# Patient Record
Sex: FEMALE | Race: WHITE | NOT HISPANIC OR LATINO | Employment: FULL TIME | URBAN - METROPOLITAN AREA
[De-identification: names, ages, dates, MRNs, and addresses within clinical notes are randomized per-mention and may not be internally consistent; named-entity substitution may affect disease eponyms.]

---

## 2024-07-11 ENCOUNTER — CLINICAL SUPPORT (OUTPATIENT)
Dept: AUDIOLOGY | Facility: CLINIC | Age: 29
End: 2024-07-11
Payer: MEDICAID

## 2024-07-11 ENCOUNTER — APPOINTMENT (OUTPATIENT)
Dept: OTOLARYNGOLOGY | Facility: CLINIC | Age: 29
End: 2024-07-11
Payer: MEDICAID

## 2024-07-11 VITALS — BODY MASS INDEX: 43.41 KG/M2 | WEIGHT: 245 LBS | HEIGHT: 63 IN

## 2024-07-11 DIAGNOSIS — H90.41 SENSORINEURAL HEARING LOSS (SNHL) OF RIGHT EAR WITH UNRESTRICTED HEARING OF LEFT EAR: Primary | ICD-10-CM

## 2024-07-11 DIAGNOSIS — H93.11 TINNITUS OF RIGHT EAR: ICD-10-CM

## 2024-07-11 PROCEDURE — 99203 OFFICE O/P NEW LOW 30 MIN: CPT | Performed by: PHYSICIAN ASSISTANT

## 2024-07-11 PROCEDURE — 92557 COMPREHENSIVE HEARING TEST: CPT | Performed by: AUDIOLOGIST

## 2024-07-11 PROCEDURE — 92567 TYMPANOMETRY: CPT | Performed by: AUDIOLOGIST

## 2024-07-11 RX ORDER — LAMOTRIGINE 25 MG/1
50 TABLET ORAL
COMMUNITY
Start: 2022-11-21

## 2024-07-11 RX ORDER — ESCITALOPRAM OXALATE 10 MG/1
10 TABLET ORAL
COMMUNITY
Start: 2023-11-09

## 2024-07-11 RX ORDER — ALBUTEROL SULFATE 90 UG/1
2-4 AEROSOL, METERED RESPIRATORY (INHALATION) EVERY 2 HOUR PRN
COMMUNITY
Start: 2022-11-21

## 2024-07-11 RX ORDER — BENZONATATE 100 MG/1
100 CAPSULE ORAL EVERY 8 HOURS PRN
COMMUNITY
Start: 2022-11-21

## 2024-07-11 NOTE — PROGRESS NOTES
Sandy Cao is a 28 y.o. year old female patient with Tinnitus     The patient presents to the office today for concerns of diminished hearing and tinnitus in the right ear.  Patient reports that this occurred suddenly. She denies prior history of previous hearing loss. She denies other ENT related concerns at this time.       Review of Systems   All other systems reviewed and are negative.        Physical Exam:   General appearance: No acute distress. Normal facies. Symmetric facial movement. No gross lesions of the face are noted.  The external ear structures appear normal. The ear canals patent and the tympanic membranes are intact without evidence of air-fluid levels, retraction, or congenital defects.  Anterior rhinoscopy notes essentially a midline nasal septum. Examination is noted for normal healthy mucosal membranes without any evidence of lesions, polyps, or exudate. The tongue is normally mobile. There are no lesions on the gingiva, buccal, or oral mucosa. There are no oral cavity masses.  The neck is negative for mass lymphadenopathy. The trachea and parotid are clear. The thyroid bed is grossly unremarkable. The salivary gland structures are grossly unremarkable.    The patient's audiogram demonstrates essentially normal hearing left with a mild to severe sloping sensorineural hearing loss right.    Assessment/Plan   1.  Sensorineural hearing loss right ear    Patient seen in the office today for assessment of ears and hearing with sensorineural hearing loss right with tinnitus complaints.  The patient had reported sudden hearing loss 1 year ago.  Patient has mild to severe sensorineural hearing loss right ear at this time is going to be set up for MRI IAC temporal bone.  Will see the patient back after MRI.

## 2024-07-11 NOTE — PROGRESS NOTES
Sandy, age 28, was seen today for a hearing evaluation during her ENT visit with Dr. Son's physician's assistant, Tez Dorantes.  She reported concern for right hearing loss/muffled sensation and constant tinnitus.  She reported this seemed to be a sudden change while showering one year ago.      Results:  Otoscopy revealed clear ear canals and tympanic membranes were visualized bilaterally.  Tympanometry revealed normal, Type A tympanograms, indicating normal ear canal volume, peak pressure and compliance bilaterally.  Audiometric thresholds revealed normal hearing sensitivity in her left ear and a mild sloping to severe sensorineural hearing loss in her right ear.  Word recognition scores were excellent bilaterally.    Recommendations:  Follow-up with ENT, as medically directed.  Retest hearing in 6 months for monitoring purposes in conjunction with otologic management.  Consider amplification for right ear pending medical clearance.

## 2024-08-06 ENCOUNTER — HOSPITAL ENCOUNTER (OUTPATIENT)
Dept: RADIOLOGY | Facility: CLINIC | Age: 29
Discharge: HOME | End: 2024-08-06
Payer: MEDICAID

## 2024-08-06 DIAGNOSIS — H90.41 SENSORINEURAL HEARING LOSS (SNHL) OF RIGHT EAR WITH UNRESTRICTED HEARING OF LEFT EAR: ICD-10-CM

## 2024-08-06 PROCEDURE — 2550000001 HC RX 255 CONTRASTS: Performed by: PHYSICIAN ASSISTANT

## 2024-08-06 PROCEDURE — 70553 MRI BRAIN STEM W/O & W/DYE: CPT | Performed by: RADIOLOGY

## 2024-08-06 PROCEDURE — A9575 INJ GADOTERATE MEGLUMI 0.1ML: HCPCS | Performed by: PHYSICIAN ASSISTANT

## 2024-08-06 PROCEDURE — 70553 MRI BRAIN STEM W/O & W/DYE: CPT

## 2024-08-06 RX ORDER — GADOTERATE MEGLUMINE 376.9 MG/ML
22 INJECTION INTRAVENOUS
Status: COMPLETED | OUTPATIENT
Start: 2024-08-06 | End: 2024-08-06

## 2024-08-26 ENCOUNTER — APPOINTMENT (OUTPATIENT)
Dept: OTOLARYNGOLOGY | Facility: CLINIC | Age: 29
End: 2024-08-26
Payer: MEDICAID

## 2024-08-26 DIAGNOSIS — D33.3 ACOUSTIC NEUROMA (MULTI): Primary | ICD-10-CM

## 2024-08-26 PROCEDURE — 99213 OFFICE O/P EST LOW 20 MIN: CPT | Performed by: OTOLARYNGOLOGY

## 2024-08-26 NOTE — PROGRESS NOTES
The patient returns.  We are seeing her back today with her completed MRI done for unilateral hearing loss right side with tinnitus.  She shows evidence of a 3.2 cm acoustic neuroma.  She is here to review same.  Remaining ENT inquiry is clear.    Exam:  No acute distress.  The external ear structures appear normal. The ear canals patent and the tympanic membranes are intact without evidence of air-fluid levels, retraction, or congenital defects.  Anterior rhinoscopy notes essentially a midline nasal septum. Examination is noted for normal healthy mucosal membranes without any evidence of lesions, polyps, or exudate. The tongue is normally mobile. There are no lesions on the gingiva, buccal, or oral mucosa. There are no oral cavity masses.  The neck is negative for mass lymphadenopathy. The trachea and parotid are clear. The thyroid bed is grossly unremarkable. The salivary gland structures are grossly unremarkable.    MRI has been reviewed by me personally as noted above.    Assessment and plan:  Very prominent acoustic neuroma right side.  Patient was recommended to see our dedicated colleague Dr. Conrad and neurotology for definitive intervention in particular at her young age of 28.  Very detailed discussion with her in this regard with all questions answered.  See me back personally should other issues arise.

## 2024-09-10 ENCOUNTER — APPOINTMENT (OUTPATIENT)
Dept: OTOLARYNGOLOGY | Facility: CLINIC | Age: 29
End: 2024-09-10
Payer: MEDICAID

## 2024-09-10 DIAGNOSIS — D33.3 ACOUSTIC NEUROMA (MULTI): Primary | ICD-10-CM

## 2024-09-10 DIAGNOSIS — H90.41 SENSORINEURAL HEARING LOSS (SNHL) OF RIGHT EAR WITH UNRESTRICTED HEARING OF LEFT EAR: ICD-10-CM

## 2024-09-10 PROCEDURE — 99204 OFFICE O/P NEW MOD 45 MIN: CPT | Performed by: OTOLARYNGOLOGY

## 2024-09-10 NOTE — PROGRESS NOTES
Sandy Cao is a 28 y.o. female is referred by Dr Prashanth Son for evaluation and management of a right IAC/CPA lesion. The isit was done virtually using a AV platform. The patient is accompanied by no one.   When asked about ear pain, hearing loss, discharge from ear, tinnitus, imbalance or vertigo, the patient admits to intermittent headache, and progressive hearing loss with tinnitus. Hearing has gotten worse since last audiogram.  When asked about a significant past otological history including history of prior ear surgery, noise exposure, exposure to ototoxic drugs or agents, and/or family history of hearing loss, the patient admits to no significant past history.  The patient's current medications, active allergies and list of medical problems were reviewed in the EHR and confirmed electronically there are as follows;  Allergies:   Allergies   Allergen Reactions    Cat Dander Unknown    House Dust Mite Unknown     Current list of medications:   Current Outpatient Medications   Medication Sig Dispense Refill    albuterol 90 mcg/actuation inhaler Inhale 2-4 puffs every 2 hours if needed.      benzonatate (Tessalon) 100 mg capsule Take 1 capsule (100 mg) by mouth every 8 hours if needed.      escitalopram (Lexapro) 10 mg tablet Take 1 tablet (10 mg) by mouth once daily.      lamoTRIgine (LaMICtal) 25 mg tablet Take 2 tablets (50 mg) by mouth once daily.       No current facility-administered medications for this visit.     Problem list:  Patient Active Problem List   Diagnosis    Sensorineural hearing loss (SNHL) of right ear with unrestricted hearing of left ear    Tinnitus of right ear    Acoustic neuroma (Multi)     Medical history:  History reviewed. No pertinent past medical history.  Surgical history:  History reviewed. No pertinent surgical history.  Family history:  No family history on file.  Social history:       Physical Examination:  CONSTITUTIONAL:  No acute distress  VOICE:  No hoarseness or other  abnormality  RESPIRATION:  Breathing comfortably, no stridor  EYES:  EOM intact, sclera clear  NEURO:  Alert and oriented times 3, Cranial nerves II-XII grossly intact and symmetric bilaterally  HEAD AND FACE:  Symmetric facial features, no masses or lesions    SKIN:  Neck and facial skin is without scar or injury  PSYCH:  Alert and oriented with appropriate mood and affect    Diagnostic testing:  An MRI available for review showed a right  IAC/CPA lesion with radiographic features consistent with a right-sided vestibular schwannoma. The tumor dimension are 3.. x 2.3 x 2.2. A mass effect on the brainstem is present without hydrocephalus.  The audiogram showed mild to moderate SNHL on the right with good SD  I personally reviewed the available patient's external record and independently reviewed their audiometric testing [and] radiographic imaging through the appropriate viewing software as detailed in my note and agree with the detailed report.    Impression:  right  CPA lesion  right  Sensorineural hearing loss    Recommendation:  The patient's condition was reviewed and explained. The treatment options and alternatives were discussed in length and those included observation, radiotherapy and microsurgery. Given the patient's age and hearing, and the tumor characteristics, I recommended surgical management with a retrosigmoid approach. The surgical risks discussed included but not limited to rarely death, bleeding, infection, CSF leak, facial nerve injury, residual disease, abdominal hematoma and other rare complications. A neurosurgical consultation and medical clearance will be completed prior to scheduling the surgery. All questions were answered to the patient's satisfaction. Also will need a repeat audiogram.     I discussed with the patient the complexity of my medical decision making including the treatment and testing rational, indications of their elective procedure and possible adverse effects and/or  complications. Based on the provided documentation and my professional assessment of this patient's newly diagnosed conditions, the complexity of evaluation and treatment is moderate.    This note was created using speech recognition transcription software. Despite proofreading, several typographical errors might be present that might affect the meaning of the content. Please call with any questions.    Patient Education:  Vestibular Schwannoma   Vestibular schwannoma, also called acoustic neuroma, is a benign tumor involving the hearing and balance nerve at the base of the brain. It occurs in is about 1 per 100,000 people per year. Acoustic neuromas do not spread throughout the body, but can cause significant disability, including hearing loss, dizziness, facial numbness, and, rarely in this era, even death, by local growth into nearby important brain structures.     Early symptoms of an acoustic neuroma include hearing loss, distorted sound perception, tinnitus, dizziness, and disequilibrium. Later symptoms may include headache, unsteadiness, facial pain, tingling, or numbness, facial tics or weakness, double vision, and difficulty in swallowing or talking.   There are a number of tests that can be utilized to diagnose acoustic neuromas, the utility of which should be based upon a complete history and physical by an experienced physician. The definitive diagnostic test is an MRI with gadolinium enhancement. However, this test should only be obtained following appropriate clinical evaluation, and hearing, and balance testing where indicated.     Management options include observation with serial MRIs, partial or total surgical removal, and radiation therapy. The treatment is tailored to the individual, and depends upon the patient's symptoms, hearing level, health status, age, and the growth rate of the tumor, and the wishes of the patient. Surgery for acoustic neuromas requiring intervention will involve one of  three basic approaches: (1) through the temple or middle fossa approach, (2) through the ear or translabyrinthine, and (3) through the back of the head or retrosigmoid approach. The approach used depends upon the size and location of the tumor, the status of the preoperative hearing, and the experience and preference of the surgical team. The optimal treatment goal is removal of the tumor while maintaining existing hearing and facial function. In many cases, hearing in the affected ear cannot be preserved. Since acoustic neuromas are usually slow growing, partial tumor removal may be elected by the surgeon to reduce surgical time and preserve facial function. For patients with a growing tumor who are not surgical candidates for whatever reason, targeted radiation is a good alternative.     Possible complications that may require further medical and/or surgical rehabilitation include: hearing loss, dizziness, facial weakness or paralysis, prolonged headaches, fluid leak from around the brain, and tumor recurrence. Many of these complications can occur after either microsurgery or radiation therapy.

## 2024-10-02 DIAGNOSIS — D33.3 ACOUSTIC NEUROMA (MULTI): ICD-10-CM

## 2024-10-02 DIAGNOSIS — H90.41 SENSORINEURAL HEARING LOSS (SNHL) OF RIGHT EAR WITH UNRESTRICTED HEARING OF LEFT EAR: ICD-10-CM

## 2024-10-07 ENCOUNTER — LAB (OUTPATIENT)
Dept: LAB | Facility: LAB | Age: 29
End: 2024-10-07
Payer: MEDICAID

## 2024-10-07 ENCOUNTER — OFFICE VISIT (OUTPATIENT)
Dept: NEUROSURGERY | Facility: HOSPITAL | Age: 29
End: 2024-10-07
Payer: MEDICAID

## 2024-10-07 VITALS
WEIGHT: 246.91 LBS | HEIGHT: 63 IN | SYSTOLIC BLOOD PRESSURE: 116 MMHG | RESPIRATION RATE: 17 BRPM | HEART RATE: 79 BPM | DIASTOLIC BLOOD PRESSURE: 79 MMHG | TEMPERATURE: 98.1 F | BODY MASS INDEX: 43.75 KG/M2

## 2024-10-07 DIAGNOSIS — Z01.818 PREOPERATIVE EXAMINATION: ICD-10-CM

## 2024-10-07 DIAGNOSIS — D33.3 ACOUSTIC NEUROMA (MULTI): Primary | ICD-10-CM

## 2024-10-07 DIAGNOSIS — D33.3 ACOUSTIC NEUROMA (MULTI): ICD-10-CM

## 2024-10-07 LAB
ABO GROUP (TYPE) IN BLOOD: NORMAL
ALBUMIN SERPL BCP-MCNC: 4.3 G/DL (ref 3.4–5)
ALP SERPL-CCNC: 45 U/L (ref 33–110)
ALT SERPL W P-5'-P-CCNC: 17 U/L (ref 7–45)
ANION GAP SERPL CALC-SCNC: 10 MMOL/L (ref 10–20)
ANTIBODY SCREEN: NORMAL
APPEARANCE UR: CLEAR
APTT PPP: 34 SECONDS (ref 27–38)
AST SERPL W P-5'-P-CCNC: 15 U/L (ref 9–39)
BASOPHILS # BLD AUTO: 0.03 X10*3/UL (ref 0–0.1)
BASOPHILS NFR BLD AUTO: 0.4 %
BILIRUB SERPL-MCNC: 0.5 MG/DL (ref 0–1.2)
BILIRUB UR STRIP.AUTO-MCNC: NEGATIVE MG/DL
BUN SERPL-MCNC: 10 MG/DL (ref 6–23)
CALCIUM SERPL-MCNC: 9.9 MG/DL (ref 8.6–10.6)
CHLORIDE SERPL-SCNC: 104 MMOL/L (ref 98–107)
CO2 SERPL-SCNC: 29 MMOL/L (ref 21–32)
COLOR UR: NORMAL
CREAT SERPL-MCNC: 0.92 MG/DL (ref 0.5–1.05)
EGFRCR SERPLBLD CKD-EPI 2021: 88 ML/MIN/1.73M*2
EOSINOPHIL # BLD AUTO: 0.28 X10*3/UL (ref 0–0.7)
EOSINOPHIL NFR BLD AUTO: 4 %
ERYTHROCYTE [DISTWIDTH] IN BLOOD BY AUTOMATED COUNT: 12.3 % (ref 11.5–14.5)
GLUCOSE SERPL-MCNC: 104 MG/DL (ref 74–99)
GLUCOSE UR STRIP.AUTO-MCNC: NORMAL MG/DL
HCT VFR BLD AUTO: 39.8 % (ref 36–46)
HGB BLD-MCNC: 13.5 G/DL (ref 12–16)
IMM GRANULOCYTES # BLD AUTO: 0.03 X10*3/UL (ref 0–0.7)
IMM GRANULOCYTES NFR BLD AUTO: 0.4 % (ref 0–0.9)
INR PPP: 1 (ref 0.9–1.1)
KETONES UR STRIP.AUTO-MCNC: NEGATIVE MG/DL
LEUKOCYTE ESTERASE UR QL STRIP.AUTO: NEGATIVE
LYMPHOCYTES # BLD AUTO: 2.49 X10*3/UL (ref 1.2–4.8)
LYMPHOCYTES NFR BLD AUTO: 35.4 %
MCH RBC QN AUTO: 29.2 PG (ref 26–34)
MCHC RBC AUTO-ENTMCNC: 33.9 G/DL (ref 32–36)
MCV RBC AUTO: 86 FL (ref 80–100)
MONOCYTES # BLD AUTO: 0.52 X10*3/UL (ref 0.1–1)
MONOCYTES NFR BLD AUTO: 7.4 %
NEUTROPHILS # BLD AUTO: 3.69 X10*3/UL (ref 1.2–7.7)
NEUTROPHILS NFR BLD AUTO: 52.4 %
NITRITE UR QL STRIP.AUTO: NEGATIVE
NRBC BLD-RTO: 0 /100 WBCS (ref 0–0)
PH UR STRIP.AUTO: 7 [PH]
PLATELET # BLD AUTO: 228 X10*3/UL (ref 150–450)
POTASSIUM SERPL-SCNC: 4.1 MMOL/L (ref 3.5–5.3)
PROT SERPL-MCNC: 6.8 G/DL (ref 6.4–8.2)
PROT UR STRIP.AUTO-MCNC: NEGATIVE MG/DL
PROTHROMBIN TIME: 11.4 SECONDS (ref 9.8–12.8)
RBC # BLD AUTO: 4.62 X10*6/UL (ref 4–5.2)
RBC # UR STRIP.AUTO: NEGATIVE /UL
RH FACTOR (ANTIGEN D): NORMAL
SODIUM SERPL-SCNC: 139 MMOL/L (ref 136–145)
SP GR UR STRIP.AUTO: 1.01
UROBILINOGEN UR STRIP.AUTO-MCNC: NORMAL MG/DL
WBC # BLD AUTO: 7 X10*3/UL (ref 4.4–11.3)

## 2024-10-07 PROCEDURE — 99214 OFFICE O/P EST MOD 30 MIN: CPT | Mod: 57 | Performed by: NEUROLOGICAL SURGERY

## 2024-10-07 PROCEDURE — 86900 BLOOD TYPING SEROLOGIC ABO: CPT

## 2024-10-07 PROCEDURE — 85610 PROTHROMBIN TIME: CPT

## 2024-10-07 PROCEDURE — 85025 COMPLETE CBC W/AUTO DIFF WBC: CPT

## 2024-10-07 PROCEDURE — 86850 RBC ANTIBODY SCREEN: CPT

## 2024-10-07 PROCEDURE — 3008F BODY MASS INDEX DOCD: CPT | Performed by: NEUROLOGICAL SURGERY

## 2024-10-07 PROCEDURE — 81003 URINALYSIS AUTO W/O SCOPE: CPT

## 2024-10-07 PROCEDURE — 80053 COMPREHEN METABOLIC PANEL: CPT

## 2024-10-07 PROCEDURE — 99204 OFFICE O/P NEW MOD 45 MIN: CPT | Performed by: NEUROLOGICAL SURGERY

## 2024-10-07 PROCEDURE — 85730 THROMBOPLASTIN TIME PARTIAL: CPT

## 2024-10-07 PROCEDURE — 86901 BLOOD TYPING SEROLOGIC RH(D): CPT

## 2024-10-07 PROCEDURE — 36415 COLL VENOUS BLD VENIPUNCTURE: CPT

## 2024-10-07 RX ORDER — CHLORHEXIDINE GLUCONATE 40 MG/ML
SOLUTION TOPICAL DAILY
Qty: 473 ML | Refills: 0 | Status: SHIPPED | OUTPATIENT
Start: 2024-10-07 | End: 2024-10-12

## 2024-10-07 RX ORDER — CHLORHEXIDINE GLUCONATE ORAL RINSE 1.2 MG/ML
SOLUTION DENTAL
Qty: 118 ML | Refills: 0 | Status: SHIPPED | OUTPATIENT
Start: 2024-10-07

## 2024-10-07 ASSESSMENT — ENCOUNTER SYMPTOMS
LOSS OF SENSATION IN FEET: 0
DEPRESSION: 1
OCCASIONAL FEELINGS OF UNSTEADINESS: 0

## 2024-10-07 ASSESSMENT — PAIN SCALES - GENERAL: PAINLEVEL: 0-NO PAIN

## 2024-10-07 NOTE — H&P (VIEW-ONLY)
"[facility]  Neurosurgery    History of Present Illness      Sandy Cao is a 28-year-old female with a PMH significant for anxiety, depression, who presented for evaluation of right sided hearing loss with tinnitus with Dr. Son with ENT. Audiogram was completed 07/11/24 with normal hearing on the left and severe SNHL on the right with word recognition excellent bilaterally. Given findings patient was recommended for MRI IAC where she was found to have a 3.2cm acoustic neuroma. Patient was evaluated by Dr. Conrad and recommended for surgical resection via translab approach. She was referred to neurosurgery and presents to clinic for surgical discussion.        She reports right sided decreased hearing that started about 2 years ago and reports some mild right facial numbness.           Objective      Vitals:   /79 (BP Location: Left arm, Patient Position: Sitting, BP Cuff Size: Large adult)   Pulse 79   Temp 36.7 °C (98.1 °F)   Resp 17   Ht 1.6 m (5' 3\")   Wt 112 kg (246 lb 14.6 oz)   LMP  (LMP Unknown)   BMI 43.74 kg/m²         Physical Exam:    Awake, alert, oriented times 3.   EOM intact  Pupils are equal round and reactive  R V1: 70%, V2:80%, V3: 80% to light touch  L V1-3: 100% to light touch  No facial asymmetry  Decreased R sided hearing  Midline tongue protrusion  Symmetric shoulder elevation  Symmetric head turning    5/5 in all extremities  No sensory deficits          Relevant Results:    I reviewed MRI  MRI IAC 08/06/24:           Assessment & Plan      Diagnosis:  Diagnoses and all orders for this visit:  Acoustic neuroma (Multi)          Provider Impression:   Mrs Cao presents to discuss treatment options for her tumor that is concerning for right sided vestibular schwannoma. I explained that given the large size and her age, I recommended treatment. Radiosurgery is not an option given the tumor size, so I recommended surgical resection. She understands the risks and " agreed to proceed with surgery. We will place a lumbar drain intraoperatively.     Patient seen and examined and has symptoamtic large acoustic tumor    Natural history and treatment options discussed in detail.    Given patient age, symptoms, etc. I recommend consideration for resection with Dr Conrad transmastoid resection translabyrinthine    Risks of treatment and alternatives discussed in detail (bleeding, infection, paralysis, stroke, death, observation, facial nerver paralysis, radiosurgeryu, CSF leak, deafness will be permanent on right, etc.) and patient agrees to proceed as noted above.    I also discussed that I perform overlapping surgical cases but would be present for all critical portions of the surgical procedure.    Medical History     No past medical history on file.  No past surgical history on file.     No family history on file.  Allergies   Allergen Reactions    Cat Dander Unknown    House Dust Mite Unknown     Current Outpatient Medications   Medication Instructions    albuterol 90 mcg/actuation inhaler 2-4 puffs, inhalation, Every 2 hour PRN    benzonatate (TESSALON) 100 mg, oral, Every 8 hours PRN    escitalopram (LEXAPRO) 10 mg, oral, Daily RT    lamoTRIgine (LAMICTAL) 50 mg, oral, Daily RT

## 2024-10-07 NOTE — PROGRESS NOTES
"[facility]  Neurosurgery    History of Present Illness      Sandy Cao is a 28-year-old female with a PMH significant for anxiety, depression, who presented for evaluation of right sided hearing loss with tinnitus with Dr. Son with ENT. Audiogram was completed 07/11/24 with normal hearing on the left and severe SNHL on the right with word recognition excellent bilaterally. Given findings patient was recommended for MRI IAC where she was found to have a 3.2cm acoustic neuroma. Patient was evaluated by Dr. Conrad and recommended for surgical resection via translab approach. She was referred to neurosurgery and presents to clinic for surgical discussion.        She reports right sided decreased hearing that started about 2 years ago and reports some mild right facial numbness.           Objective      Vitals:   /79 (BP Location: Left arm, Patient Position: Sitting, BP Cuff Size: Large adult)   Pulse 79   Temp 36.7 °C (98.1 °F)   Resp 17   Ht 1.6 m (5' 3\")   Wt 112 kg (246 lb 14.6 oz)   LMP  (LMP Unknown)   BMI 43.74 kg/m²         Physical Exam:    Awake, alert, oriented times 3.   EOM intact  Pupils are equal round and reactive  R V1: 70%, V2:80%, V3: 80% to light touch  L V1-3: 100% to light touch  No facial asymmetry  Decreased R sided hearing  Midline tongue protrusion  Symmetric shoulder elevation  Symmetric head turning    5/5 in all extremities  No sensory deficits          Relevant Results:    I reviewed MRI  MRI IAC 08/06/24:           Assessment & Plan      Diagnosis:  Diagnoses and all orders for this visit:  Acoustic neuroma (Multi)          Provider Impression:   Mrs Cao presents to discuss treatment options for her tumor that is concerning for right sided vestibular schwannoma. I explained that given the large size and her age, I recommended treatment. Radiosurgery is not an option given the tumor size, so I recommended surgical resection. She understands the risks and " agreed to proceed with surgery. We will place a lumbar drain intraoperatively.     Patient seen and examined and has symptoamtic large acoustic tumor    Natural history and treatment options discussed in detail.    Given patient age, symptoms, etc. I recommend consideration for resection with Dr Conrad transmastoid resection translabyrinthine    Risks of treatment and alternatives discussed in detail (bleeding, infection, paralysis, stroke, death, observation, facial nerver paralysis, radiosurgeryu, CSF leak, deafness will be permanent on right, etc.) and patient agrees to proceed as noted above.    I also discussed that I perform overlapping surgical cases but would be present for all critical portions of the surgical procedure.    Medical History     No past medical history on file.  No past surgical history on file.     No family history on file.  Allergies   Allergen Reactions    Cat Dander Unknown    House Dust Mite Unknown     Current Outpatient Medications   Medication Instructions    albuterol 90 mcg/actuation inhaler 2-4 puffs, inhalation, Every 2 hour PRN    benzonatate (TESSALON) 100 mg, oral, Every 8 hours PRN    escitalopram (LEXAPRO) 10 mg, oral, Daily RT    lamoTRIgine (LAMICTAL) 50 mg, oral, Daily RT

## 2024-10-08 ENCOUNTER — CLINICAL SUPPORT (OUTPATIENT)
Dept: PREADMISSION TESTING | Facility: HOSPITAL | Age: 29
End: 2024-10-08
Payer: MEDICAID

## 2024-10-08 NOTE — CPM/PAT NURSE NOTE
CPM/PAT Nurse Note      Name: Sandy Cao (Sandy Cao)  /Age: 1995/ y.o.       Past Medical History:   Diagnosis Date    Acoustic neuroma (Multi)     Anxiety     Asthma     Depression     HL (hearing loss)     Mood disorder (CMS-HCC)     Tinnitus        Past Surgical History:   Procedure Laterality Date    ESOPHAGOGASTRODUODENOSCOPY         Patient  has no history on file for sexual activity.    No family history on file.    Allergies   Allergen Reactions    Cat Dander Unknown    House Dust Mite Unknown       Prior to Admission medications    Medication Sig Start Date End Date Taking? Authorizing Provider   albuterol 90 mcg/actuation inhaler Inhale 2-4 puffs every 2 hours if needed. 22   Historical Provider, MD   benzonatate (Tessalon) 100 mg capsule Take 1 capsule (100 mg) by mouth every 8 hours if needed. 22   Historical Provider, MD   chlorhexidine (Hibiclens) 4 % external liquid Apply topically once daily for 5 days. Use nightly starting 5 nights prior to surgery. Shower like normal then apply soap and avoid face and groin. Leave on for 3 -5 minutes after rinse off. 10/7/24 10/12/24  Lesley Paulson, APRN-CNP   chlorhexidine (Peridex) 0.12 % solution RINSE MOUTH WITH 15ML (1 CAPFUL) FOR 30 SECONDS AM AND PM PRIOR TO SURGERY. AFTER TOOTHBRUSHING. EXPECTORATE AFTER. 10/7/24   MARCO Salcedo-CNP   escitalopram (Lexapro) 10 mg tablet Take 1 tablet (10 mg) by mouth once daily. 23   Historical Provider, MD   lamoTRIgine (LaMICtal) 25 mg tablet Take 2 tablets (50 mg) by mouth once daily. 22   Historical Provider, MD HOUSE ROS     DASI Risk Score    No data to display       Caprini DVT Assessment    No data to display       Modified Frailty Index    No data to display       CHADS2 Stroke Risk  Current as of about an hour ago        N/A 3 to 100%: High Risk   2 to < 3%: Medium Risk   0 to < 2%: Low Risk     Last Change: N/A          This score determines the  patient's risk of having a stroke if the patient has atrial fibrillation.        This score is not applicable to this patient. Components are not calculated.          Revised Cardiac Risk Index    No data to display       Apfel Simplified Score    No data to display       Risk Analysis Index Results This Encounter    No data found in the last 10 encounters.     Scheduled for RIGHT SIDE TRANSLABYRINTHINE APPROACH FOR ACOUSTIC NEUROMA RESECTION AND REPAIR - Right with Dr. Morgan and Dr. Conrad on 10/14/24.    Neurosurgery: MARCO Salcedo-CNP LOV 10/7/24 presented for evaluation of right sided hearing loss with tinnitus with Dr. Son with ENT. She was referred to neurosurgery and presents to clinic for surgical discussion.   Otolaryngology: Estella Conrad MD LOV 9/10/24 referred by Dr Prashanth Son for evaluation and management of a right IAC/CPA lesion.   Otolaryngology: Prashanth Son MD LOV 8/26/24 seeing her back today with her completed MRI done for unilateral hearing loss right side with tinnitus.   Otolaryngology: Tez Dorantes PA-C LOV 7/11/24 seen for diminished hearing and tinnitus in the right ear.     - MR IAC W AND WO IV CONTRAST; 8/6/2024   IMPRESSION:  3.2 cm enhancing mass within the right internal auditory canal and  right cerebellopontine angle region likely corresponding to acoustic  neuroma/vestibular schwannoma. There is no definite evidence of  extension to the basal turn of the cochlea. There is mass effect on  the veronica, cerebellum, and right brachium pontis without vasogenic  edema. There is also mild mass effect along the rightward aspect of  the 4th ventricle with no evidence of hydrocephalus.      No acute infarct, recent hemorrhage, or additional abnormal  enhancement or intracranial mass effect.      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    Nurse Plan of Action:    ONLY    Jess Villegas RN  Pre-Admission Testing

## 2024-10-10 ENCOUNTER — APPOINTMENT (OUTPATIENT)
Dept: PREADMISSION TESTING | Facility: HOSPITAL | Age: 29
End: 2024-10-10
Payer: MEDICAID

## 2024-10-11 NOTE — PROGRESS NOTES
Pharmacy Medication History Review    Sandy Cao is a 28 y.o. female who is planned to be admitted for No Principal Problem: There is no principal problem currently on the Problem List. Please update the Problem List and refresh.. Pharmacy called the patient prior to their scheduled procedure and reviewed the patient's [id number] medications for accuracy.    Medications ADDED:  none  Medications CHANGED:  none  Medications REMOVED:   Albuterol HFA  Benzonatate 100mg  Escitalopram 10mg  Lamotrigine 25mg    Please review updated prior to admission medication list and comments regarding how patient may be taking medications differently by going to Admission tab --> Admission Orders --> Admit Orders / Review prior to admission medications.     Preferred pharmacy and allergies to be confirmed with patient by nursing the day of procedure.     Sources used to complete the med history include spoke with patient, surescripts, jean marierrs, care everywhere    Below are additional concerns with the patient's PTA list.  Patient states they do NOT take any medications at this time    Lavonne Cabello    Meds Ambulatory and Retail Services  Please reach out via Secure Chat for questions

## 2024-10-13 ENCOUNTER — ANESTHESIA EVENT (OUTPATIENT)
Dept: OPERATING ROOM | Facility: HOSPITAL | Age: 29
End: 2024-10-13
Payer: MEDICAID

## 2024-10-14 ENCOUNTER — HOSPITAL ENCOUNTER (INPATIENT)
Facility: HOSPITAL | Age: 29
LOS: 4 days | Discharge: HOME | End: 2024-10-18
Attending: NEUROLOGICAL SURGERY | Admitting: NEUROLOGICAL SURGERY
Payer: MEDICAID

## 2024-10-14 ENCOUNTER — APPOINTMENT (OUTPATIENT)
Dept: RADIOLOGY | Facility: HOSPITAL | Age: 29
End: 2024-10-14
Payer: MEDICAID

## 2024-10-14 ENCOUNTER — ANESTHESIA (OUTPATIENT)
Dept: OPERATING ROOM | Facility: HOSPITAL | Age: 29
End: 2024-10-14
Payer: MEDICAID

## 2024-10-14 ENCOUNTER — HOSPITAL ENCOUNTER (INPATIENT)
Dept: NEUROLOGY | Facility: HOSPITAL | Age: 29
Discharge: HOME | End: 2024-10-14
Payer: MEDICAID

## 2024-10-14 DIAGNOSIS — H90.41 SENSORINEURAL HEARING LOSS (SNHL) OF RIGHT EAR WITH UNRESTRICTED HEARING OF LEFT EAR: ICD-10-CM

## 2024-10-14 DIAGNOSIS — Z98.890 S/P CRANIOTOMY: ICD-10-CM

## 2024-10-14 DIAGNOSIS — G89.18 POST-OP PAIN: ICD-10-CM

## 2024-10-14 DIAGNOSIS — D33.3 ACOUSTIC NEUROMA (MULTI): Primary | ICD-10-CM

## 2024-10-14 DIAGNOSIS — Z74.09 IMPAIRED FUNCTIONAL MOBILITY AND ENDURANCE: ICD-10-CM

## 2024-10-14 LAB
ABO GROUP (TYPE) IN BLOOD: NORMAL
ABO GROUP (TYPE) IN BLOOD: NORMAL
ALBUMIN SERPL BCP-MCNC: 4.4 G/DL (ref 3.4–5)
ANION GAP SERPL CALC-SCNC: 18 MMOL/L (ref 10–20)
ANTIBODY SCREEN: NORMAL
APTT PPP: 25 SECONDS (ref 27–38)
BUN SERPL-MCNC: 11 MG/DL (ref 6–23)
CALCIUM SERPL-MCNC: 9.3 MG/DL (ref 8.6–10.6)
CHLORIDE SERPL-SCNC: 103 MMOL/L (ref 98–107)
CO2 SERPL-SCNC: 21 MMOL/L (ref 21–32)
CREAT SERPL-MCNC: 0.93 MG/DL (ref 0.5–1.05)
EGFRCR SERPLBLD CKD-EPI 2021: 87 ML/MIN/1.73M*2
ERYTHROCYTE [DISTWIDTH] IN BLOOD BY AUTOMATED COUNT: 12 % (ref 11.5–14.5)
GLUCOSE BLD MANUAL STRIP-MCNC: 187 MG/DL (ref 74–99)
GLUCOSE BLD MANUAL STRIP-MCNC: 209 MG/DL (ref 74–99)
GLUCOSE SERPL-MCNC: 206 MG/DL (ref 74–99)
HCT VFR BLD AUTO: 34.7 % (ref 36–46)
HGB BLD-MCNC: 12.3 G/DL (ref 12–16)
INR PPP: 1.1 (ref 0.9–1.1)
MAGNESIUM SERPL-MCNC: 1.49 MG/DL (ref 1.6–2.4)
MCH RBC QN AUTO: 28.6 PG (ref 26–34)
MCHC RBC AUTO-ENTMCNC: 35.4 G/DL (ref 32–36)
MCV RBC AUTO: 81 FL (ref 80–100)
NRBC BLD-RTO: 0 /100 WBCS (ref 0–0)
PHOSPHATE SERPL-MCNC: 2.2 MG/DL (ref 2.5–4.9)
PLATELET # BLD AUTO: 294 X10*3/UL (ref 150–450)
POTASSIUM SERPL-SCNC: 3.8 MMOL/L (ref 3.5–5.3)
PROTHROMBIN TIME: 12.1 SECONDS (ref 9.8–12.8)
RBC # BLD AUTO: 4.3 X10*6/UL (ref 4–5.2)
RH FACTOR (ANTIGEN D): NORMAL
RH FACTOR (ANTIGEN D): NORMAL
SODIUM SERPL-SCNC: 138 MMOL/L (ref 136–145)
WBC # BLD AUTO: 20.8 X10*3/UL (ref 4.4–11.3)

## 2024-10-14 PROCEDURE — 85610 PROTHROMBIN TIME: CPT | Performed by: STUDENT IN AN ORGANIZED HEALTH CARE EDUCATION/TRAINING PROGRAM

## 2024-10-14 PROCEDURE — 2500000002 HC RX 250 W HCPCS SELF ADMINISTERED DRUGS (ALT 637 FOR MEDICARE OP, ALT 636 FOR OP/ED)

## 2024-10-14 PROCEDURE — 70450 CT HEAD/BRAIN W/O DYE: CPT

## 2024-10-14 PROCEDURE — 69990 MICROSURGERY ADD-ON: CPT | Performed by: NEUROLOGICAL SURGERY

## 2024-10-14 PROCEDURE — 37799 UNLISTED PX VASCULAR SURGERY: CPT | Performed by: STUDENT IN AN ORGANIZED HEALTH CARE EDUCATION/TRAINING PROGRAM

## 2024-10-14 PROCEDURE — 2500000001 HC RX 250 WO HCPCS SELF ADMINISTERED DRUGS (ALT 637 FOR MEDICARE OP): Performed by: REGISTERED NURSE

## 2024-10-14 PROCEDURE — P9045 ALBUMIN (HUMAN), 5%, 250 ML: HCPCS | Mod: JZ

## 2024-10-14 PROCEDURE — A61595 PR TRANSTEMPORAL APPROACH/SKULL: Performed by: ANESTHESIOLOGY

## 2024-10-14 PROCEDURE — 3700000001 HC GENERAL ANESTHESIA TIME - INITIAL BASE CHARGE: Performed by: NEUROLOGICAL SURGERY

## 2024-10-14 PROCEDURE — 2500000001 HC RX 250 WO HCPCS SELF ADMINISTERED DRUGS (ALT 637 FOR MEDICARE OP)

## 2024-10-14 PROCEDURE — 3600000010 HC OR TIME - EACH INCREMENTAL 1 MINUTE - PROCEDURE LEVEL FIVE: Performed by: NEUROLOGICAL SURGERY

## 2024-10-14 PROCEDURE — 2500000005 HC RX 250 GENERAL PHARMACY W/O HCPCS

## 2024-10-14 PROCEDURE — 2500000004 HC RX 250 GENERAL PHARMACY W/ HCPCS (ALT 636 FOR OP/ED): Mod: JZ | Performed by: STUDENT IN AN ORGANIZED HEALTH CARE EDUCATION/TRAINING PROGRAM

## 2024-10-14 PROCEDURE — 2500000004 HC RX 250 GENERAL PHARMACY W/ HCPCS (ALT 636 FOR OP/ED)

## 2024-10-14 PROCEDURE — 2500000004 HC RX 250 GENERAL PHARMACY W/ HCPCS (ALT 636 FOR OP/ED): Performed by: NEUROLOGICAL SURGERY

## 2024-10-14 PROCEDURE — 83735 ASSAY OF MAGNESIUM: CPT | Performed by: STUDENT IN AN ORGANIZED HEALTH CARE EDUCATION/TRAINING PROGRAM

## 2024-10-14 PROCEDURE — 3700000002 HC GENERAL ANESTHESIA TIME - EACH INCREMENTAL 1 MINUTE: Performed by: NEUROLOGICAL SURGERY

## 2024-10-14 PROCEDURE — 70450 CT HEAD/BRAIN W/O DYE: CPT | Performed by: RADIOLOGY

## 2024-10-14 PROCEDURE — 2500000005 HC RX 250 GENERAL PHARMACY W/O HCPCS: Performed by: NEUROLOGICAL SURGERY

## 2024-10-14 PROCEDURE — 00BN0ZZ EXCISION OF ACOUSTIC NERVE, OPEN APPROACH: ICD-10-PCS | Performed by: NEUROLOGICAL SURGERY

## 2024-10-14 PROCEDURE — 2020000001 HC ICU ROOM DAILY

## 2024-10-14 PROCEDURE — 2500000002 HC RX 250 W HCPCS SELF ADMINISTERED DRUGS (ALT 637 FOR MEDICARE OP, ALT 636 FOR OP/ED): Performed by: REGISTERED NURSE

## 2024-10-14 PROCEDURE — 61616 RESECT/EXCISE LESION SKULL: CPT | Performed by: NEUROLOGICAL SURGERY

## 2024-10-14 PROCEDURE — 61595 TRANSTEMPORAL APPROACH/SKULL: CPT | Performed by: OTOLARYNGOLOGY

## 2024-10-14 PROCEDURE — 36415 COLL VENOUS BLD VENIPUNCTURE: CPT

## 2024-10-14 PROCEDURE — 62272 THER SPI PNXR DRG CSF: CPT | Performed by: NEUROLOGICAL SURGERY

## 2024-10-14 PROCEDURE — 95925 SOMATOSENSORY TESTING: CPT

## 2024-10-14 PROCEDURE — C1713 ANCHOR/SCREW BN/BN,TIS/BN: HCPCS | Performed by: NEUROLOGICAL SURGERY

## 2024-10-14 PROCEDURE — 88307 TISSUE EXAM BY PATHOLOGIST: CPT | Mod: TC,SUR | Performed by: OTOLARYNGOLOGY

## 2024-10-14 PROCEDURE — 85027 COMPLETE CBC AUTOMATED: CPT | Performed by: STUDENT IN AN ORGANIZED HEALTH CARE EDUCATION/TRAINING PROGRAM

## 2024-10-14 PROCEDURE — 76937 US GUIDE VASCULAR ACCESS: CPT

## 2024-10-14 PROCEDURE — 7100000001 HC RECOVERY ROOM TIME - INITIAL BASE CHARGE: Performed by: NEUROLOGICAL SURGERY

## 2024-10-14 PROCEDURE — 61616 RESECT/EXCISE LESION SKULL: CPT | Performed by: OTOLARYNGOLOGY

## 2024-10-14 PROCEDURE — 86901 BLOOD TYPING SEROLOGIC RH(D): CPT

## 2024-10-14 PROCEDURE — 84100 ASSAY OF PHOSPHORUS: CPT | Performed by: STUDENT IN AN ORGANIZED HEALTH CARE EDUCATION/TRAINING PROGRAM

## 2024-10-14 PROCEDURE — 0JB80ZZ EXCISION OF ABDOMEN SUBCUTANEOUS TISSUE AND FASCIA, OPEN APPROACH: ICD-10-PCS | Performed by: OTOLARYNGOLOGY

## 2024-10-14 PROCEDURE — 7100000002 HC RECOVERY ROOM TIME - EACH INCREMENTAL 1 MINUTE: Performed by: NEUROLOGICAL SURGERY

## 2024-10-14 PROCEDURE — 0NR507Z REPLACEMENT OF RIGHT TEMPORAL BONE WITH AUTOLOGOUS TISSUE SUBSTITUTE, OPEN APPROACH: ICD-10-PCS | Performed by: OTOLARYNGOLOGY

## 2024-10-14 PROCEDURE — 15769 GRFG AUTOL SOFT TISS DIR EXC: CPT | Performed by: OTOLARYNGOLOGY

## 2024-10-14 PROCEDURE — C1729 CATH, DRAINAGE: HCPCS | Performed by: NEUROLOGICAL SURGERY

## 2024-10-14 PROCEDURE — 82947 ASSAY GLUCOSE BLOOD QUANT: CPT

## 2024-10-14 PROCEDURE — 36620 INSERTION CATHETER ARTERY: CPT

## 2024-10-14 PROCEDURE — 3600000005 HC OR TIME - INITIAL BASE CHARGE - PROCEDURE LEVEL FIVE: Performed by: NEUROLOGICAL SURGERY

## 2024-10-14 PROCEDURE — 2720000007 HC OR 272 NO HCPCS: Performed by: NEUROLOGICAL SURGERY

## 2024-10-14 PROCEDURE — C1763 CONN TISS, NON-HUMAN: HCPCS | Performed by: NEUROLOGICAL SURGERY

## 2024-10-14 PROCEDURE — 2780000003 HC OR 278 NO HCPCS: Performed by: NEUROLOGICAL SURGERY

## 2024-10-14 DEVICE — IMPLANTABLE DEVICE: Type: IMPLANTABLE DEVICE | Site: CRANIAL | Status: FUNCTIONAL

## 2024-10-14 DEVICE — DURAGEN® PLUS DURAL REGENERATION MATRIX, 1 IN X 1 IN (2.5 CM X 2.5 CM)
Type: IMPLANTABLE DEVICE | Site: CRANIAL | Status: FUNCTIONAL
Brand: DURAGEN® PLUS

## 2024-10-14 DEVICE — SCREW, NEURO, ONEDRIVE, 1.5 X 4MM: Type: IMPLANTABLE DEVICE | Site: CRANIAL | Status: FUNCTIONAL

## 2024-10-14 RX ORDER — ACETAMINOPHEN 325 MG/1
975 TABLET ORAL ONCE
Status: COMPLETED | OUTPATIENT
Start: 2024-10-14 | End: 2024-10-14

## 2024-10-14 RX ORDER — ACETAMINOPHEN 500 MG
5 TABLET ORAL NIGHTLY
Status: DISCONTINUED | OUTPATIENT
Start: 2024-10-14 | End: 2024-10-18 | Stop reason: HOSPADM

## 2024-10-14 RX ORDER — ONDANSETRON HYDROCHLORIDE 2 MG/ML
4 INJECTION, SOLUTION INTRAVENOUS EVERY 8 HOURS PRN
Status: DISCONTINUED | OUTPATIENT
Start: 2024-10-14 | End: 2024-10-18 | Stop reason: HOSPADM

## 2024-10-14 RX ORDER — ONDANSETRON HYDROCHLORIDE 2 MG/ML
INJECTION, SOLUTION INTRAVENOUS AS NEEDED
Status: DISCONTINUED | OUTPATIENT
Start: 2024-10-14 | End: 2024-10-14

## 2024-10-14 RX ORDER — HYDROMORPHONE HYDROCHLORIDE 1 MG/ML
INJECTION, SOLUTION INTRAMUSCULAR; INTRAVENOUS; SUBCUTANEOUS AS NEEDED
Status: DISCONTINUED | OUTPATIENT
Start: 2024-10-14 | End: 2024-10-14

## 2024-10-14 RX ORDER — ACETAMINOPHEN 325 MG/1
650 TABLET ORAL EVERY 4 HOURS PRN
Status: DISCONTINUED | OUTPATIENT
Start: 2024-10-14 | End: 2024-10-18 | Stop reason: HOSPADM

## 2024-10-14 RX ORDER — NEOSTIGMINE METHYLSULFATE 0.5 MG/ML
INJECTION INTRAVENOUS AS NEEDED
Status: DISCONTINUED | OUTPATIENT
Start: 2024-10-14 | End: 2024-10-14

## 2024-10-14 RX ORDER — HYDROMORPHONE HYDROCHLORIDE 1 MG/ML
0.5 INJECTION, SOLUTION INTRAMUSCULAR; INTRAVENOUS; SUBCUTANEOUS EVERY 5 MIN PRN
Status: DISCONTINUED | OUTPATIENT
Start: 2024-10-14 | End: 2024-10-14 | Stop reason: HOSPADM

## 2024-10-14 RX ORDER — APREPITANT 40 MG/1
40 CAPSULE ORAL ONCE
Status: COMPLETED | OUTPATIENT
Start: 2024-10-14 | End: 2024-10-14

## 2024-10-14 RX ORDER — SODIUM CHLORIDE, SODIUM LACTATE, POTASSIUM CHLORIDE, CALCIUM CHLORIDE 600; 310; 30; 20 MG/100ML; MG/100ML; MG/100ML; MG/100ML
INJECTION, SOLUTION INTRAVENOUS CONTINUOUS PRN
Status: DISCONTINUED | OUTPATIENT
Start: 2024-10-14 | End: 2024-10-14

## 2024-10-14 RX ORDER — LIDOCAINE HYDROCHLORIDE AND EPINEPHRINE 10; 10 MG/ML; UG/ML
INJECTION, SOLUTION INFILTRATION; PERINEURAL AS NEEDED
Status: DISCONTINUED | OUTPATIENT
Start: 2024-10-14 | End: 2024-10-14 | Stop reason: HOSPADM

## 2024-10-14 RX ORDER — FENTANYL CITRATE-0.9 % NACL/PF 10 MCG/ML
PLASTIC BAG, INJECTION (ML) INTRAVENOUS CONTINUOUS PRN
Status: DISCONTINUED | OUTPATIENT
Start: 2024-10-14 | End: 2024-10-14

## 2024-10-14 RX ORDER — INSULIN LISPRO 100 [IU]/ML
0-5 INJECTION, SOLUTION INTRAVENOUS; SUBCUTANEOUS
Status: DISCONTINUED | OUTPATIENT
Start: 2024-10-14 | End: 2024-10-18 | Stop reason: HOSPADM

## 2024-10-14 RX ORDER — LIDOCAINE HYDROCHLORIDE 10 MG/ML
0.1 INJECTION, SOLUTION INFILTRATION; PERINEURAL ONCE
Status: DISCONTINUED | OUTPATIENT
Start: 2024-10-14 | End: 2024-10-14 | Stop reason: HOSPADM

## 2024-10-14 RX ORDER — LABETALOL HYDROCHLORIDE 5 MG/ML
10 INJECTION, SOLUTION INTRAVENOUS EVERY 10 MIN PRN
Status: DISCONTINUED | OUTPATIENT
Start: 2024-10-14 | End: 2024-10-15

## 2024-10-14 RX ORDER — FENTANYL CITRATE 50 UG/ML
INJECTION, SOLUTION INTRAMUSCULAR; INTRAVENOUS AS NEEDED
Status: DISCONTINUED | OUTPATIENT
Start: 2024-10-14 | End: 2024-10-14

## 2024-10-14 RX ORDER — OXYCODONE HYDROCHLORIDE 5 MG/1
5 TABLET ORAL EVERY 4 HOURS PRN
Status: DISCONTINUED | OUTPATIENT
Start: 2024-10-14 | End: 2024-10-14 | Stop reason: HOSPADM

## 2024-10-14 RX ORDER — HYDRALAZINE HYDROCHLORIDE 20 MG/ML
10 INJECTION INTRAMUSCULAR; INTRAVENOUS
Status: DISCONTINUED | OUTPATIENT
Start: 2024-10-14 | End: 2024-10-15

## 2024-10-14 RX ORDER — ALBUMIN HUMAN 50 G/1000ML
SOLUTION INTRAVENOUS AS NEEDED
Status: DISCONTINUED | OUTPATIENT
Start: 2024-10-14 | End: 2024-10-14

## 2024-10-14 RX ORDER — METOCLOPRAMIDE 10 MG/1
10 TABLET ORAL EVERY 6 HOURS PRN
Status: DISCONTINUED | OUTPATIENT
Start: 2024-10-14 | End: 2024-10-18 | Stop reason: HOSPADM

## 2024-10-14 RX ORDER — HYDROMORPHONE HYDROCHLORIDE 1 MG/ML
0.2 INJECTION, SOLUTION INTRAMUSCULAR; INTRAVENOUS; SUBCUTANEOUS EVERY 5 MIN PRN
Status: DISCONTINUED | OUTPATIENT
Start: 2024-10-14 | End: 2024-10-14 | Stop reason: HOSPADM

## 2024-10-14 RX ORDER — NICARDIPINE HYDROCHLORIDE 0.2 MG/ML
2.5-15 INJECTION INTRAVENOUS CONTINUOUS
Status: DISCONTINUED | OUTPATIENT
Start: 2024-10-14 | End: 2024-10-14

## 2024-10-14 RX ORDER — HYDROXYZINE HYDROCHLORIDE 25 MG/1
25 TABLET, FILM COATED ORAL EVERY 8 HOURS PRN
Status: DISCONTINUED | OUTPATIENT
Start: 2024-10-14 | End: 2024-10-18 | Stop reason: HOSPADM

## 2024-10-14 RX ORDER — OXYCODONE HYDROCHLORIDE 5 MG/1
5 TABLET ORAL EVERY 4 HOURS PRN
Status: DISCONTINUED | OUTPATIENT
Start: 2024-10-14 | End: 2024-10-18 | Stop reason: HOSPADM

## 2024-10-14 RX ORDER — PANTOPRAZOLE SODIUM 40 MG/1
40 TABLET, DELAYED RELEASE ORAL
Status: DISCONTINUED | OUTPATIENT
Start: 2024-10-15 | End: 2024-10-18 | Stop reason: HOSPADM

## 2024-10-14 RX ORDER — OXYCODONE HYDROCHLORIDE 5 MG/1
10 TABLET ORAL EVERY 4 HOURS PRN
Status: DISCONTINUED | OUTPATIENT
Start: 2024-10-14 | End: 2024-10-14 | Stop reason: HOSPADM

## 2024-10-14 RX ORDER — METOCLOPRAMIDE HYDROCHLORIDE 5 MG/ML
10 INJECTION INTRAMUSCULAR; INTRAVENOUS EVERY 6 HOURS PRN
Status: DISCONTINUED | OUTPATIENT
Start: 2024-10-14 | End: 2024-10-18 | Stop reason: HOSPADM

## 2024-10-14 RX ORDER — LIDOCAINE HCL/PF 100 MG/5ML
SYRINGE (ML) INTRAVENOUS AS NEEDED
Status: DISCONTINUED | OUTPATIENT
Start: 2024-10-14 | End: 2024-10-14

## 2024-10-14 RX ORDER — SODIUM CHLORIDE, SODIUM LACTATE, POTASSIUM CHLORIDE, CALCIUM CHLORIDE 600; 310; 30; 20 MG/100ML; MG/100ML; MG/100ML; MG/100ML
100 INJECTION, SOLUTION INTRAVENOUS CONTINUOUS
Status: DISCONTINUED | OUTPATIENT
Start: 2024-10-14 | End: 2024-10-14 | Stop reason: HOSPADM

## 2024-10-14 RX ORDER — ESMOLOL HYDROCHLORIDE 10 MG/ML
INJECTION INTRAVENOUS AS NEEDED
Status: DISCONTINUED | OUTPATIENT
Start: 2024-10-14 | End: 2024-10-14

## 2024-10-14 RX ORDER — HYDRALAZINE HYDROCHLORIDE 20 MG/ML
5 INJECTION INTRAMUSCULAR; INTRAVENOUS
Status: DISCONTINUED | OUTPATIENT
Start: 2024-10-14 | End: 2024-10-14 | Stop reason: HOSPADM

## 2024-10-14 RX ORDER — SENNOSIDES 8.6 MG/1
2 TABLET ORAL 2 TIMES DAILY
Status: DISCONTINUED | OUTPATIENT
Start: 2024-10-14 | End: 2024-10-18 | Stop reason: HOSPADM

## 2024-10-14 RX ORDER — ONDANSETRON HYDROCHLORIDE 2 MG/ML
4 INJECTION, SOLUTION INTRAVENOUS ONCE AS NEEDED
Status: DISCONTINUED | OUTPATIENT
Start: 2024-10-14 | End: 2024-10-14 | Stop reason: HOSPADM

## 2024-10-14 RX ORDER — REMIFENTANIL HYDROCHLORIDE 1 MG/ML
INJECTION, POWDER, LYOPHILIZED, FOR SOLUTION INTRAVENOUS AS NEEDED
Status: DISCONTINUED | OUTPATIENT
Start: 2024-10-14 | End: 2024-10-14

## 2024-10-14 RX ORDER — PROPOFOL 10 MG/ML
INJECTION, EMULSION INTRAVENOUS AS NEEDED
Status: DISCONTINUED | OUTPATIENT
Start: 2024-10-14 | End: 2024-10-14

## 2024-10-14 RX ORDER — INSULIN LISPRO 100 [IU]/ML
0-5 INJECTION, SOLUTION INTRAVENOUS; SUBCUTANEOUS
Status: DISCONTINUED | OUTPATIENT
Start: 2024-10-14 | End: 2024-10-14

## 2024-10-14 RX ORDER — LABETALOL HYDROCHLORIDE 5 MG/ML
5 INJECTION, SOLUTION INTRAVENOUS EVERY 5 MIN PRN
Status: DISCONTINUED | OUTPATIENT
Start: 2024-10-14 | End: 2024-10-14 | Stop reason: HOSPADM

## 2024-10-14 RX ORDER — LABETALOL HYDROCHLORIDE 5 MG/ML
INJECTION, SOLUTION INTRAVENOUS AS NEEDED
Status: DISCONTINUED | OUTPATIENT
Start: 2024-10-14 | End: 2024-10-14

## 2024-10-14 RX ORDER — ROCURONIUM BROMIDE 10 MG/ML
INJECTION, SOLUTION INTRAVENOUS AS NEEDED
Status: DISCONTINUED | OUTPATIENT
Start: 2024-10-14 | End: 2024-10-14

## 2024-10-14 RX ORDER — PHENYLEPHRINE HYDROCHLORIDE 10 MG/ML
INJECTION INTRAVENOUS AS NEEDED
Status: DISCONTINUED | OUTPATIENT
Start: 2024-10-14 | End: 2024-10-14

## 2024-10-14 RX ORDER — ONDANSETRON 4 MG/1
4 TABLET, FILM COATED ORAL EVERY 8 HOURS PRN
Status: DISCONTINUED | OUTPATIENT
Start: 2024-10-14 | End: 2024-10-18 | Stop reason: HOSPADM

## 2024-10-14 RX ORDER — MIDAZOLAM HYDROCHLORIDE 1 MG/ML
INJECTION INTRAMUSCULAR; INTRAVENOUS AS NEEDED
Status: DISCONTINUED | OUTPATIENT
Start: 2024-10-14 | End: 2024-10-14

## 2024-10-14 RX ORDER — POLYETHYLENE GLYCOL 3350 17 G/17G
17 POWDER, FOR SOLUTION ORAL DAILY
Status: DISCONTINUED | OUTPATIENT
Start: 2024-10-14 | End: 2024-10-18 | Stop reason: HOSPADM

## 2024-10-14 RX ORDER — SODIUM CHLORIDE 0.9 G/100ML
IRRIGANT IRRIGATION AS NEEDED
Status: DISCONTINUED | OUTPATIENT
Start: 2024-10-14 | End: 2024-10-14 | Stop reason: HOSPADM

## 2024-10-14 RX ORDER — NICARDIPINE HYDROCHLORIDE 0.2 MG/ML
2.5-15 INJECTION INTRAVENOUS CONTINUOUS
Status: DISCONTINUED | OUTPATIENT
Start: 2024-10-14 | End: 2024-10-14 | Stop reason: SDUPTHER

## 2024-10-14 RX ORDER — OXYCODONE HYDROCHLORIDE 5 MG/1
10 TABLET ORAL EVERY 4 HOURS PRN
Status: DISCONTINUED | OUTPATIENT
Start: 2024-10-14 | End: 2024-10-18 | Stop reason: HOSPADM

## 2024-10-14 RX ORDER — HYDROMORPHONE HYDROCHLORIDE 1 MG/ML
0.2 INJECTION, SOLUTION INTRAMUSCULAR; INTRAVENOUS; SUBCUTANEOUS
Status: DISCONTINUED | OUTPATIENT
Start: 2024-10-14 | End: 2024-10-18 | Stop reason: HOSPADM

## 2024-10-14 RX ORDER — DEXTROSE 50 % IN WATER (D50W) INTRAVENOUS SYRINGE
12.5
Status: DISCONTINUED | OUTPATIENT
Start: 2024-10-14 | End: 2024-10-18 | Stop reason: HOSPADM

## 2024-10-14 RX ORDER — CEFAZOLIN 1 G/1
INJECTION, POWDER, FOR SOLUTION INTRAVENOUS AS NEEDED
Status: DISCONTINUED | OUTPATIENT
Start: 2024-10-14 | End: 2024-10-14

## 2024-10-14 RX ORDER — DEXTROSE 50 % IN WATER (D50W) INTRAVENOUS SYRINGE
25
Status: DISCONTINUED | OUTPATIENT
Start: 2024-10-14 | End: 2024-10-18 | Stop reason: HOSPADM

## 2024-10-14 RX ORDER — CEFAZOLIN SODIUM 2 G/100ML
2 INJECTION, SOLUTION INTRAVENOUS EVERY 12 HOURS
Status: COMPLETED | OUTPATIENT
Start: 2024-10-14 | End: 2024-10-16

## 2024-10-14 RX ORDER — GLYCOPYRROLATE 0.2 MG/ML
INJECTION INTRAMUSCULAR; INTRAVENOUS AS NEEDED
Status: DISCONTINUED | OUTPATIENT
Start: 2024-10-14 | End: 2024-10-14

## 2024-10-14 RX ORDER — VANCOMYCIN HYDROCHLORIDE 1 G/20ML
INJECTION, POWDER, LYOPHILIZED, FOR SOLUTION INTRAVENOUS AS NEEDED
Status: DISCONTINUED | OUTPATIENT
Start: 2024-10-14 | End: 2024-10-14

## 2024-10-14 SDOH — HEALTH STABILITY: MENTAL HEALTH: CURRENT SMOKER: 0

## 2024-10-14 ASSESSMENT — PAIN SCALES - GENERAL
PAINLEVEL_OUTOF10: 5 - MODERATE PAIN
PAINLEVEL_OUTOF10: 0 - NO PAIN
PAINLEVEL_OUTOF10: 5 - MODERATE PAIN
PAINLEVEL_OUTOF10: 7
PAINLEVEL_OUTOF10: 8
PAINLEVEL_OUTOF10: 3
PAINLEVEL_OUTOF10: 8
PAINLEVEL_OUTOF10: 6
PAINLEVEL_OUTOF10: 8

## 2024-10-14 ASSESSMENT — PAIN - FUNCTIONAL ASSESSMENT
PAIN_FUNCTIONAL_ASSESSMENT: 0-10
PAIN_FUNCTIONAL_ASSESSMENT: UNABLE TO SELF-REPORT
PAIN_FUNCTIONAL_ASSESSMENT: 0-10

## 2024-10-14 ASSESSMENT — PAIN DESCRIPTION - LOCATION: LOCATION: HEAD

## 2024-10-14 ASSESSMENT — PAIN DESCRIPTION - ORIENTATION: ORIENTATION: RIGHT

## 2024-10-14 ASSESSMENT — PAIN DESCRIPTION - DESCRIPTORS
DESCRIPTORS: DISCOMFORT;PRESSURE
DESCRIPTORS: ACHING

## 2024-10-14 NOTE — ANESTHESIA PROCEDURE NOTES
Arterial Line:    Date/Time: 10/14/2024 8:05 AM    Staffing  Performed: attending and resident   Authorized by: Dharmesh Schofield MD PhD    Performed by: Vivi Patricia MD    An arterial line was placed. Procedure performed using ultrasound guidance.in the OR for the following indication(s): continuous blood pressure monitoring and blood sampling needed.    A 20 gauge (size), 12 cm (length), Arrow (type) catheter was placed into the Left brachial artery, secured by Tegaderm,   Seldinger technique used.  Events:  patient tolerated procedure well with no complications.

## 2024-10-14 NOTE — ANESTHESIA POSTPROCEDURE EVALUATION
Patient: Sandy Cao    Procedure Summary       Date: 10/14/24 Room / Location: [facility] OR 26 / [facility] [place] OR    Anesthesia Start: 0737 Anesthesia Stop: 1626    Procedures:       RIGHT SIDE TRANSLABYRINTHINE APPROACH FOR ACOUSTIC NEUROMA RESECTION AND REPAIR (Right: Head)      RIGHT SIDE TRANSLABYRINTHINE APPROACH FOR ACOUSTIC NEUROMA RESECTION AND REPAIR (Right: Head) Diagnosis:       Acoustic neuroma (Multi)      Sensorineural hearing loss (SNHL) of right ear with unrestricted hearing of left ear      (Acoustic neuroma (Multi) [D33.3])      (Sensorineural hearing loss (SNHL) of right ear with unrestricted hearing of left ear [H90.41])    Surgeons: Boy Morgan MD; Estella Conrad MD Responsible Provider: Dharmesh Schofield MD PhD    Anesthesia Type: general ASA Status: 2            Anesthesia Type: general    Vitals Value Taken Time   /70 10/14/24 1618   Temp 36 10/14/24 1626   Pulse 104 10/14/24 1624   Resp 9 10/14/24 1624   SpO2 95 % 10/14/24 1624   Vitals shown include unfiled device data.    Anesthesia Post Evaluation    Patient location during evaluation: PACU  Patient participation: complete - patient cannot participate  Level of consciousness: awake, sleepy but conscious and responsive to verbal stimuli  Pain management: adequate  Airway patency: patent  Cardiovascular status: acceptable  Respiratory status: acceptable  Hydration status: acceptable  Postoperative Nausea and Vomiting: none        No notable events documented.

## 2024-10-14 NOTE — BRIEF OP NOTE
Date: 10/14/2024  OR Location: [facility] OR    Name: Sandy Cao, : 1995, Age: 28 y.o., MRN: [id number], Sex: female    Diagnosis  Pre-op Diagnosis      * Acoustic neuroma (Multi) [D33.3]     * Sensorineural hearing loss (SNHL) of right ear with unrestricted hearing of left ear [H90.41] Post-op Diagnosis     * Acoustic neuroma (Multi) [D33.3]     * Sensorineural hearing loss (SNHL) of right ear with unrestricted hearing of left ear [H90.41]     Procedures  RIGHT SIDE TRANSLABYRINTHINE APPROACH FOR ACOUSTIC NEUROMA RESECTION AND REPAIR  87540 - AK TRANSTEMP APPR POST CRAN FOSSA DCOMPR SINUS/NRV    RIGHT SIDE TRANSLABYRINTHINE APPROACH FOR ACOUSTIC NEUROMA RESECTION AND REPAIR  43947 - AK TRANSTEMP APPR POST CRAN FOSSA DCOMPR SINUS/NRV    AK RESCJ/EXC LES BASE PCF FORAMEN VRT BODIES IDRL [65850]  AK GRAFTING OF AUTOLOGOUS SOFT TISS BY DIRECT EXC [47226]  Surgeons   Panel 1:     * Boy Morgan - Primary  Panel 2:     * Estella Conrad - Primary    Resident/Fellow/Other Assistant:  Surgeons and Role:  Panel 1:     * Christelle Hartmann MD - Resident - Assisting  Panel 2:     * Maritza Mathew MD - Fellow    Procedure Summary  Anesthesia: Anesthesia type not filed in the log.  ASA: II  Anesthesia Staff: Anesthesiologist: Dharmesh Schofield MD PhD  C-AA: LEANNA Thomas  Anesthesia Resident: Vivi Patricia MD  Estimated Blood Loss: 100mL  Intra-op Medications:   Administrations occurring from 0635 to 1725 on 10/14/24:   Medication Name Total Dose   sodium chloride 0.9 % irrigation solution 14,000 mL   lidocaine-epinephrine (Xylocaine W/EPI) 1 %-1:100,000 injection 10 mL   polymyxin B 500,000 Units in sodium chloride 0.9 % 1,000 mL irrigation 1,000 mL   thrombin-bovine (JMI) 5,000 unit topical solution 10,000 Units   acetaminophen (Tylenol) tablet 975 mg 975 mg   aprepitant (Emend) capsule 40 mg 40 mg              Anesthesia Record               Intraprocedure I/O Totals          Intake     Fentanyl Drip 0.00 mL    The total shown is the total volume documented since Anesthesia Start was filed.    LR bolus 1500.00 mL    Remifentanil Drip 0.00 mL    The total shown is the total volume documented since Anesthesia Start was filed.    Total Intake 1500 mL       Output    Urine 1175 mL    Est. Blood Loss 100 mL    Other 30 mL    Total Output 1305 mL       Net    Net Volume 195 mL          Specimen:   ID Type Source Tests Collected by Time   1 : RIGHT BRAIN MASS Tissue BRAIN RESECTION SURGICAL PATHOLOGY EXAM Boy Morgan MD 10/14/2024 5007        Staff:   Circulator: Adrian  Scrub Person: Juliette Marie Scrub: Christal Marie Circulator: Vita          Findings: large CP angle tumor, good facial nerve stimulation at the completion of the case    Complications:  None; patient tolerated the procedure well.     Disposition: PACU - hemodynamically stable.  Condition: stable  Specimens Collected:   ID Type Source Tests Collected by Time   1 : RIGHT BRAIN MASS Tissue BRAIN RESECTION SURGICAL PATHOLOGY EXAM Boy Morgan MD 10/14/2024 2323     Attending Attestation: I was present and scrubbed for the key portions of the procedure.    Boy Morgan  Phone Number: [phone]

## 2024-10-14 NOTE — ANESTHESIA PREPROCEDURE EVALUATION
Patient: Sandy Cao    Procedure Information       Date/Time: 10/14/24 0635    Procedures:       RIGHT SIDE TRANSLABYRINTHINE APPROACH FOR ACOUSTIC NEUROMA RESECTION AND REPAIR (Right)      RIGHT SIDE TRANSLABYRINTHINE APPROACH FOR ACOUSTIC NEUROMA RESECTION AND REPAIR (Right)    Location: [facility] OR 26 / [facility] OR    Surgeons: Boy Morgan MD; Estella Conrad MD            Relevant Problems   Neuro   (+) Acoustic neuroma (Multi)      HEENT   (+) Sensorineural hearing loss (SNHL) of right ear with unrestricted hearing of left ear       Clinical information reviewed:   Tobacco  Allergies  Meds   Med Hx  Surg Hx   Fam Hx  Soc Hx        NPO Detail:  No data recorded     Physical Exam    Airway  Mallampati: I  TM distance: >3 FB  Neck ROM: full     Cardiovascular   Rhythm: regular  Rate: normal     Dental - normal exam     Pulmonary   Breath sounds clear to auscultation     Abdominal            Anesthesia Plan    History of general anesthesia?: no  History of complications of general anesthesia?: no    ASA 2     general     The patient is not a current smoker.    intravenous induction   Postoperative administration of opioids is intended.  Trial extubation is planned.  Anesthetic plan and risks discussed with patient.  Use of blood products discussed with patient who consented to blood products.    Plan discussed with resident and attending.    Attending Anesthesiologist Note  Above information reviewed including relevant HPI, PMHx, PSHx, anesthesia history, labs, and imaging.    Summary (from patient interview and chart review):   28-year-old female with a PMH significant for anxiety, depression, who presented for evaluation of right sided hearing loss with tinnitus. MRI IAC found to have a 3.2cm acoustic neuroma.     OR for translab approach resection.     Relevant Problems   Neuro   (+) Acoustic neuroma (Multi)      HEENT   (+) Sensorineural hearing loss (SNHL) of right ear with  "unrestricted hearing of left ear        Medication Documentation Review Audit       Reviewed by Riley Elise RN (Registered Nurse) on 10/14/24 at 0622      Medication Order Taking? Sig Documenting Provider Last Dose Status   chlorhexidine (Hibiclens) 4 % external liquid [id number]  Apply topically once daily for 5 days. Use nightly starting 5 nights prior to surgery. Shower like normal then apply soap and avoid face and groin. Leave on for 3 -5 minutes after rinse off. MICKI Salcedo   10/12/24 2359   chlorhexidine (Peridex) 0.12 % solution [id number]  RINSE MOUTH WITH 15ML (1 CAPFUL) FOR 30 SECONDS AM AND PM PRIOR TO SURGERY. AFTER TOOTHBRUSHING. EXPECTORATE AFTER. MICKI Salcedo  Active                    Visit Vitals  /74   Pulse 80   Temp 36.1 °C (97 °F) (Temporal)   Resp 16   SpO2 98%   OB Status Having periods   Smoking Status Never            2024    11:23 AM 2024     5:27 PM 10/7/2024    12:50 PM 10/14/2024     6:10 AM   Vitals   Systolic   116 122   Diastolic   79 74   Heart Rate   79 80   Temp   36.7 °C (98.1 °F) 36.1 °C (97 °F)   Resp   17 16   Height (in) 1.6 m (5' 3\") 1.6 m (5' 3\") 1.6 m (5' 3\")    Weight (lb) 245 247 246.92    BMI 43.4 kg/m2 43.75 kg/m2 43.74 kg/m2    BSA (m2) 2.22 m2 2.23 m2 2.23 m2    Visit Report Report  Report         Recent Labs:    Chemistry    Lab Results   Component Value Date/Time     10/07/2024 1432    K 4.1 10/07/2024 1432     10/07/2024 1432    CO2 29 10/07/2024 1432    BUN 10 10/07/2024 1432    CREATININE 0.92 10/07/2024 1432    Lab Results   Component Value Date/Time    CALCIUM 9.9 10/07/2024 1432    ALKPHOS 45 10/07/2024 1432    AST 15 10/07/2024 1432    ALT 17 10/07/2024 1432    BILITOT 0.5 10/07/2024 1432          Lab Results   Component Value Date/Time    WBC 7.0 10/07/2024 1432    HGB 13.5 10/07/2024 1432    HCT 39.8 10/07/2024 1432     10/07/2024 1432     Lab Results   Component Value Date/Time    " PROTIME 11.4 10/07/2024 1432    INR 1.0 10/07/2024 1432       Electrocardiogram:  No results found for this or any previous visit (from the past 4464 hour(s)).    Echocardiogram:      Anesthesia History: PONV  Anesthesia Plan: preop acetaminophen, emend. GA/ETT, std monitors + a-line. Sevo/prop/mateo.     I discussed the anesthesia plan with the patient and/or family and reviewed the risks, benefits and alternatives. Agree to proceed.  Dharmesh Schofield MD PhD

## 2024-10-14 NOTE — HOSPITAL COURSE
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 Patient taken to the OR for a R translab approach craniotomy for tumor resection. She also had a lumbar drain placed in the beginning of the case, that ws later removed at the end of the case. PT/OT evaluated patient and recommended low intensity level of continued therapy for which patient provided with outpatient prescriptions for vestibular therapy. The rest of the hospital course was complicated by diplopia, for which ophthalmology was consulted and attributed to optic disc edema 2/2 post-op changes. The recommendation was for eye patching and further outpatient follow-up. On the day of discharge, the pt was tolerating a diet, pain was controlled on PO pain medication, and they were ambulating and voiding spontaneously. They were discharged in stable condition with detailed instructions and scheduled outpatient follow up.

## 2024-10-14 NOTE — OP NOTE
RIGHT SIDE TRANSLABYRINTHINE APPROACH FOR ACOUSTIC NEUROMA RESECTION AND REPAIR (R) Operative Note     Date: 10/14/2024  OR Location: [facility] OR    Name: Sandy Cao, : 1995, Age: 28 y.o., MRN: [id number], Sex: female    Diagnosis  Pre-op Diagnosis      * Acoustic neuroma (Multi) [D33.3]     * Sensorineural hearing loss (SNHL) of right ear with unrestricted hearing of left ear [H90.41] Post-op Diagnosis     * Acoustic neuroma (Multi) [D33.3]     * Sensorineural hearing loss (SNHL) of right ear with unrestricted hearing of left ear [H90.41]     Procedures  RIGHT SIDE TRANSLABYRINTHINE APPROACH FOR ACOUSTIC NEUROMA RESECTION AND REPAIR  26284 - TN TRANSTEMP APPR POST CRAN FOSSA DCOMPR SINUS/NRV    RIGHT SIDE TRANSLABYRINTHINE APPROACH FOR ACOUSTIC NEUROMA RESECTION AND REPAIR  18362 - TN TRANSTEMP APPR POST CRAN FOSSA DCOMPR SINUS/NRV    TN RESCJ/EXC LES BASE PCF FORAMEN VRT BODIES IDRL [71271]  TN GRAFTING OF AUTOLOGOUS SOFT TISS BY DIRECT EXC [32603]  Surgeons   Panel 1:     * Boy Morgan - Primary  Panel 2:     * Estella Conrad - Primary    Resident/Fellow/Other Assistant:  Surgeons and Role:  Panel 1:     * Christelle Hartmann MD - Resident - Assisting  Panel 2:     * Maritza Mathew MD - Fellow    Procedure Summary  Anesthesia: Anesthesia type not filed in the log.  ASA: II  Anesthesia Staff: Anesthesiologist: Dharmesh Schofield MD PhD  C-AA: LEANNA Thomas  Anesthesia Resident: Vivi Patricia MD  Estimated Blood Loss: mL  Intra-op Medications:   Administrations occurring from 0635 to 1725 on 10/14/24:   Medication Name Total Dose   sodium chloride 0.9 % irrigation solution 14,000 mL   lidocaine-epinephrine (Xylocaine W/EPI) 1 %-1:100,000 injection 10 mL   polymyxin B 500,000 Units in sodium chloride 0.9 % 1,000 mL irrigation 1,000 mL   thrombin-bovine (JMI) 5,000 unit topical solution 10,000 Units   acetaminophen (Tylenol) tablet 975 mg 975 mg   aprepitant (Emend) capsule 40 mg  40 mg              Anesthesia Record               Intraprocedure I/O Totals          Intake    Fentanyl Drip 0.00 mL    The total shown is the total volume documented since Anesthesia Start was filed.    Remifentanil Drip 0.00 mL    The total shown is the total volume documented since Anesthesia Start was filed.    Total Intake 0 mL       Output    Urine 975 mL    Other 30 mL    Total Output 1005 mL       Net    Net Volume -1005 mL          Specimen: No specimens collected     Staff:   Circulator: Antal  Scrub Person: Juliette Marie Scrub: Christal Marie Circulator: Vita         Drains and/or Catheters:   Urethral Catheter Non-latex;Straight-tip 16 Fr. (Active)       Lumbar Drain (Active)       Tourniquet Times:         Implants:  Implants       Type Name Action Serial No.      Graft GRAFT MATRIX, DURAL, DURAGEN PLUS 1X1 - [id number] Implanted               Findings:     Indications: Sandy Cao is an 28 y.o. female who is having surgery for Acoustic neuroma (Multi) [D33.3]  Sensorineural hearing loss (SNHL) of right ear with unrestricted hearing of left ear [H90.41].     The patient was seen in the preoperative area. The risks, benefits, complications, treatment options, non-operative alternatives, expected recovery and outcomes were discussed with the patient. The possibilities of reaction to medication, pulmonary aspiration, injury to surrounding structures, bleeding, recurrent infection, the need for additional procedures, failure to diagnose a condition, and creating a complication requiring transfusion or operation were discussed with the patient. The patient concurred with the proposed plan, giving informed consent.  The site of surgery was properly noted/marked if necessary per policy. The patient has been actively warmed in preoperative area. Preoperative antibiotics  Venous thrombosis prophylaxis     Procedure Details: Patient was placed supine head turned to left right-sided translabyrinthine  transmastoid approach intradural cranial posterior skull base performed Dr. Conrad dictated separately in his note for an expanded transtentorial approach to patient's giant acoustic posterior fossa tumor following additional bony drilling to remove bone from the middle cranial fossa temporal region the dura was opened widely in the presigmoid space as well as over the temporal lobe carefully preserving venous anatomy the tentorium was divided to allow for increased access to the presigmoid intradural posterior cranial skull base space a gigantic acoustic tumor was encountered portions have been removed by Dr. Conrad also dictated in his note microdissection was then utilized to remove the remainder of the tumor from the posterior fossa carefully preserving the facial nerve which stimulated very well at the end of the procedure with near complete resection of the tumor the wound was then copiously irrigated synthetic dural graft was placed over the dural opening and the remainder of the closure performed Dr. Conrad dictated separately also prior to their initiation of the main procedure lumbar drain catheter is patent placed in the L3-4 interspace with clear spinal fluid allowed to drain intermittently throughout the procedure to aid in brain relaxation there were no complications  Complications:      Disposition:   Condition:          Additional Details:     Attending Attestation:     Boy Morgan  Phone Number: [phone]

## 2024-10-14 NOTE — ANESTHESIA PROCEDURE NOTES
Airway  Date/Time: 10/14/2024 7:50 AM  Urgency: elective    Airway not difficult    Staffing  Performed: resident   Authorized by: Dharmesh Schofield MD PhD    Performed by: Vivi Patricia MD  Patient location during procedure: OR    Indications and Patient Condition  Indications for airway management: anesthesia  Spontaneous Ventilation: absent  Sedation level: deep  Preoxygenated: yes  Patient position: sniffing  Mask difficulty assessment: 1 - vent by mask  Planned trial extubation    Final Airway Details  Final airway type: endotracheal airway      Successful airway: ETT  Cuffed: yes   Successful intubation technique: direct laryngoscopy  Facilitating devices/methods: intubating stylet  Endotracheal tube insertion site: oral  Blade: Victoria  Blade size: #3  ETT size (mm): 7.0  Cormack-Lehane Classification: grade I - full view of glottis  Placement verified by: chest auscultation and capnometry   Inital cuff pressure (cm H2O): 5  Measured from: lips  ETT to lips (cm): 22  Number of attempts at approach: 1

## 2024-10-14 NOTE — OP NOTE
RIGHT SIDE TRANSLABYRINTHINE APPROACH FOR ACOUSTIC NEUROMA RESECTION AND REPAIR (R) Operative Note     Date: 10/14/2024  OR Location: [facility] OR    Name: Sandy Cao, : 1995, Age: 28 y.o., MRN: [id number], Sex: female    Diagnosis  Pre-op Diagnosis      * Acoustic neuroma (Multi) [D33.3]     * Sensorineural hearing loss (SNHL) of right ear with unrestricted hearing of left ear [H90.41] Post-op Diagnosis     * Acoustic neuroma (Multi) [D33.3]     * Sensorineural hearing loss (SNHL) of right ear with unrestricted hearing of left ear [H90.41]     Procedures  Right extended translabyrinthine craniotomy with removal of CPA tumor, transtemporal approach to posterior cranial fossa  Intraoperative facial nerve monitoring with evoked potential stimulation and recording   [place] of abdominal fat with skull base procedure  Cranioplasty  Microsurgical techniques requiring usage of operating microscope.    Surgeons   Panel 1:     * Boy Morgan - Primary  Panel 2:     * Estella Conrad - Primary    Resident/Fellow/Other Assistant:  Surgeons and Role:  Panel 1:     * Christelle Hartmann MD - Resident - Assisting  Panel 2:     * Maritza Mathew MD - Fellow    Procedure Summary  Anesthesia: General  ASA: II  Anesthesia Staff: Anesthesiologist: Dharmesh Schofield MD PhD  C-AA: LEANNA Thomas  Anesthesia Resident: Vivi Patricia MD  Estimated Blood Loss: 150mL  Intra-op Medications:   Administrations occurring from 0635 to 1725 on 10/14/24:   Medication Name Total Dose   sodium chloride 0.9 % irrigation solution 14,000 mL   lidocaine-epinephrine (Xylocaine W/EPI) 1 %-1:100,000 injection 10 mL   polymyxin B 500,000 Units in sodium chloride 0.9 % 1,000 mL irrigation 1,000 mL   thrombin-bovine (JMI) 5,000 unit topical solution 10,000 Units   HYDROmorphone (Dilaudid) injection 0.5 mg 0.5 mg   acetaminophen (Tylenol) tablet 975 mg 975 mg   aprepitant (Emend) capsule 40 mg 40 mg              Anesthesia  Record               Intraprocedure I/O Totals          Intake    Neostigmine 0.00 mL    The total shown is the total volume documented since Anesthesia Start was filed.    Fentanyl Drip 0.00 mL    The total shown is the total volume documented since Anesthesia Start was filed.    LR bolus 1500.00 mL    Remifentanil Drip 0.00 mL    The total shown is the total volume documented since Anesthesia Start was filed.    LR infusion 199.00 mL    Total Intake 1699 mL       Output    Urine 1175 mL    Est. Blood Loss 100 mL    Other 30 mL    Total Output 1305 mL       Net    Net Volume 394 mL          Specimen:   ID Type Source Tests Collected by Time   1 : RIGHT BRAIN MASS Tissue BRAIN RESECTION SURGICAL PATHOLOGY EXAM Boy Morgan MD 10/14/2024 1320        Staff:   Circulator: Adrian  Scrub Person: Juliette Marie Scrub: Christal Marie Circulator: Vita         Drains and/or Catheters:   Urethral Catheter Non-latex;Straight-tip 16 Fr. (Active)       [REMOVED] Lumbar Drain (Removed)       Tourniquet Times:         Implants:  Implants       Type Name Action Serial No.      Graft GRAFT MATRIX, DURAL, DURAGEN PLUS 1X1 - [id number] Implanted       LEVEL ONE NEURO STERILE MESH, 61 X 40MM T=0.3MM Implanted      Screw SCREW, NEURO, ONEDRIVE, 1.5 X 4MM - [id number] Implanted             History:  Sandy Cao is a 28 y.o. female referred for management of a right vestibular schwannoma. The pre-operative audiogram showed asymmetric sensorineural hearing loss on the involved side though with preserved speech discrimination.  The MRI was reviewed and was consistent with the suggested diagnosis. Treatment options were outlined and these included observation, radiotherapy and microsurgical excision. Based on the tumor's size with brainstem compression, the patient's young age, and the patient's decision, surgical management was selected. The risks discussed included but were not limited to death, bleeding, infection,  stroke, facial paralysis, CSF leak, tinnitus, dizziness, taste disturbance and incomplete excision. Because of the tumor size, hearing could not be preserved, and a translabyrinthine craniotomy approach was recommended.    Operative findings: the posterior fossa was exposed via an extended translabyrinthine craniotomy with wider decompression of the middle fossa dura to allow the Neurosurgery team to divide the tentorium. The labyrinthine facial was positively identified distal and proximal to the tumor. The tumor appeared to originate from the inferior vestibular nerve. Gross total tumor excision was achieved; a small rind of tumor was left on the facial nerve to minimize the risk of poor facial nerve functional outcome. The proximal facial nerve at the brainstem stimulated well at 0.05mA at the conclusion of the procedure. All perilabyrinthine air cells were waxed and a muscle plug with bone wax was used to obliterate the aditus ad antrum to prevent CSF egress. The mastoid was obliterated with abdominal fat grafting and a titanium miniplate was used to perform a cranioplasty and support the fat in place.    Operative Note: The patient was seen in the pre-operative area. The informed consent was reviewed and signed. The correct side was marked. The patient was then taken back to the operative suite and laid on the operating table. A time-out was performed according to protocol. Stocking compressive devices were placed on the patient's legs prior to induction. General anesthesia was induced, and endotracheal intubation was done. An arterial line was placed and a mahoney catheter was inserted.  The table was tilted 180 degrees. A time-out was performed. Perioperative antibiotics using the neurosurgical protocol were administered. The patient was strapped to the surgical bed and secured. The electrodes of the facial nerve monitoring system were inserted percutaneously in the orbicularis oris and oculi, grounded and  checked for adequate.     An appropriate amount of hair was shaved from the post-auricular region and the left side of the abdomen in preparation for the fat graft harvest. The head was turned to the left to expose the operative ear. The intraoperative monitoring team placed the needles for somatosensory evoked potentials monitoring in their corresponding placement and secured them with staples.A mixture of 1% lidocaine and 1/100.000 epinephrine was injected in the post-auricular region. Both the operative ear and the left lower quadrant were prepped and draped using the standard sterile techniques.    A curvilinear incision situated approximately two fingerbreadths behind the post-auricular crease was carried out. The skin flaps were elevated along the plane of the superficial temporalis fascia and retracted with holding stitches. An anteriorly based musculo-periosteal flap was outlined and elevated in order to widely expose the mastoid cortex. Next, using a combination of cutting and hi burrs a wide mastoidectomy was performed with identification of the horizontal canal. The tegmen, sigmoid sinus and posterior fossa were skeletonized.  The descending segment of the facial nerve was identified using a hi shanice and traced down to the stylomastoid foramen. Next, the audiogram and the MRI were reviewed, again confirming the side of the CPA lesion. A complete labyrinthectomy was completed and the vestibule was opened. Both maculae sacculi and utriculi and all canal ampullae were removed. The endolymphatic duct was cauterized and removed. Next, dissection of the retrofacial tract allowed identification of the dome of the jugular bulb. After completion of the labyrinthectomy and removal of bone posterior to the descending facial nerve, the sigmoid sinus, and posterior and middle fossa plates were all decompressed. Bone was removed from around the porus acousticus and troughs were dissected above and below the  internal auditory canal. The canal was further skeletonized 270 degrees by gradually removing surrounding bone carrying the dissection from medial to lateral towards the vestibule. The internal canal was then carefully decompressed with small sized hi burrs and the transverse crest was identified leading to the identification of the superior and inferior vestibular nerves. The bony dissection was then carried out further laterally towards the ampula of the superior canal, until the vertical crest and the labyrinthine facial were successfully identified using the nerve stimulator. After confirmation of the exact position of the labyrinthine facial nerve, a superior vestibular neurectomy was performed and the superior vestibular nerve was reflected back allowing visualization of the labyrinthine facial.  Free muscle grafting and reinforced bone wax were used to pack the antrum and all open perilabyrinthine and retrofacial air cells. At this point of the operation, Dr Morgan stepped in to perform a durotomy and complete the intradural tumor excision. This portion of the procedure will be dictated separately by him.     Through a linear incision in the left lower quadrant, abdominal fat was harvested in preparation to pack the mastoid cavity. Hemostasis was achieved and bi-layered closure was done using Vicryl. Steri-strips were placed along the incision line and a sterile dressing was placed.    At the completion of the operation, gross total tumor resection was achieved and the facial nerve was intact and stimulated at 0.05mA at the brainstem.    Hemostasis was achieved and the dural leaflets were reflected back. Duragen and a dural sealant were placed over the dural defect and this was followed by layering strips of fat ensuring watertight closure. A titanium miniplate was trimmed and placed. 4 mm self tapping screws were used to affix it. The pericranium was then reflected back and re-approximated with  interrupted 2.0 Vicryl. The skin was closed in a bi-layered fashion. 3.0 vicryl was used for the deep dermis and 3.0 Prolene was used to reapproximate the epidermis in a water-tight fashion. Bacitracin ointment was applied to the incision line and a compressive mastoid dressing was applied over the ear. All needle and sponge counts were correct. The table was tilted back to the anesthesiologist and the patient was awakened, extubated and transferred to recovery in stable condition. Dr. Conrad was present and supervised the entire procedure.    Complications:  None; patient tolerated the procedure well.    Disposition: PACU - hemodynamically stable.  Condition: stable     Attending Attestation:

## 2024-10-14 NOTE — ANESTHESIA PROCEDURE NOTES
Peripheral IV  Date/Time: 10/14/2024 7:45 AM      Placement  Needle size: 16 G  Laterality: right  Location: hand  Local anesthetic: none  Site prep: chlorhexidine  Technique: anatomical landmarks  Attempts: 1

## 2024-10-14 NOTE — PROGRESS NOTES
[facility]  NEUROSCIENCE INTENSIVE CARE UNIT  DAILY PROGRESS NOTE       Patient Name: Sandy Cao   MRN: [id number]     Admit Date: 10/14/2024     : 1995 AGE: 28 y.o. GENDER: female        Subjective    28 y.o. female with PMH anxiety, depression.  Admitted 10/14/2024 with Acoustic neuroma (Multi) after presenting with Right-sided hearing loss and severe SNHL. MRI IAC showed a 3.2cm acoustic neuroma. Admitted 10/14/2024 for planned surgical resection of Right sided acoustic neuroma in the right internal auditory canal and CPA. Underwent R acoustic neuroma resection 10/14 without complication.    Significant Events:  - 10/14 - s/p translabyrinthine resection of right-sided acoustic neuroma       Objective   VITALS (24H):  Temp:  [36.1 °C (97 °F)-37.5 °C (99.5 °F)] 37.3 °C (99.1 °F)  Heart Rate:  [] 105  Resp:  [5-20] 5  BP: (122-141)/(70-96) 141/96  Arterial Line BP 1: (142-149)/(80-89) 145/89  INTAKE/OUTPUT:  Intake/Output Summary (Last 24 hours) at 10/14/2024 1855  Last data filed at 10/14/2024 1800  Gross per 24 hour   Intake 1774 ml   Output 1805 ml   Net -31 ml     VENT SETTINGS:        PHYSICAL EXAM:  NEURO:  - Alert, oriented x4, follows commands  - EOS, PERRL, EOMI, VFF, bilateral horizontal nystagmus  - JO 5/5 strength, no drift, no ataxia  CV:  - RRR on telemetry, NSR  - Arterial line in place  RESP:  - Regular, unlabored  - Oxygen: Nasal cannula 3L  :  - Aguirre catheter in place  GI:  - Abdomen NT/ND, soft  SKIN:  - Intact    MEDICATIONS:  Scheduled: PRN: Continuous:   ceFAZolin, 2 g, intravenous, q12h  dexAMETHasone, 8 mg, intravenous, q8h  insulin lispro, 0-5 Units, subcutaneous, TID  [START ON 10/15/2024] pantoprazole, 40 mg, oral, Daily before breakfast  polyethylene glycol, 17 g, oral, Daily  sennosides, 2 tablet, oral, BID     PRN medications: acetaminophen, dextrose, dextrose, glucagon, glucagon, hydrALAZINE, HYDROmorphone, metoclopramide **OR** metoclopramide,  ondansetron **OR** ondansetron, oxyCODONE, oxyCODONE         LAB RESULTS:  Reviewed prior labs, pending post-op labs    IMAGING RESULTS:  CT head wo IV contrast   Final Result        Postoperative changes as noted. A 1 cm collection of mildly   hyperdense tissue versus hemorrhage lies along the medial aspect of   the surgical bed abutting the veronica.        MACRO:   None        Signed by: Brian Mullen 10/14/2024 6:22 PM   Dictation workstation:   [id number]            Assessment/Plan    NEURO:  #Right sided acoustic neuroma S/P R acoustic neuroma resection 10/14  Assessment:  - No immediate complication  - CTH post op (10/14) with post-op changes, otherwise stable    Plan:  - NSU  - NSGY primary  - Neuro Checks: Q1H  - Sedation: None  - Pain: oxycodone Q4H and hydromorphone Q3H  - Nausea: ondansetron and metoclopramide  - PT/OT/SLP  - c/w dexamethasone 8mg q8h    CARDIOVASCULAR:  #KAMRYN  Assessment:  - SR on tele   - ECHO: No echo on file    Plan:  - Continue to monitor on telemetry  - BP goal: SBP < 160    --> PRN: Hydralazine     RESPIRATORY:  #KAMRYN   Assessment:  - On RA  Plan:  - Continuous pulse oximetry   - O2 PRN to maintain SpO2 > 94%, wean as tolerated  - Incentive spirometry while awake    RENAL/:  #KAMRYN  Assessment:  - Baseline BUN/Cr: 0.92 (no prior baseline)  Plan:  - Monitor with daily RFP  - Maintain mahoney catheter for: perioperative use for selected surgical procedures    FEN/GI:  #KAMRYN  Assessment:  - Last BM: PTA  Plan:  - Monitor and replace electrolytes per protocol  - IVF: None  - Diet: regular   - Bowel Regimen: Miralax QD, Senokot 2 tabs BID    ENDOCRINE:  #Hyperglycemia   Assessment:  Results from last 7 days   Lab Units 10/14/24  1811   POCT GLUCOSE mg/dL 209*   Plan:  - Accuchecks & ISS AC&HS   - Hypoglycemia protocol    HEMATOLOGY:  #KAMRYN  Assessment:  - Baseline Hgb: 13.5 (no prior lab)  - Baseline Plts: 228 (no prior lab)      Plan:  - Continue to monitor with daily CBC and Coag  panel    INFECTIOUS DISEASE:  #KAMRYN  Assessment:     - Temp (24hrs), Av.9 °C (98.5 °F), Min:36.1 °C (97 °F), Max:37.5 °C (99.5 °F)     Plan:  - Continue to monitor for s/sx of infection  - Pan culture for temperature > 38.4 C    MUSCULOSKELETAL:  - No acute issues    SKIN:  - No acute issues  - Turns and skin care per NSU protocol    ACCESS:  - piv  - left brachial A-line (10/14--)    PROPHYLAXIS:  - DVT Ppx: Hold SQH until POD #POD2  - GI Ppx: Pantoprazole    RESTRAINTS:  Not indicated/Patient does not meet criteria for restraints    MARCO Cortez-CNP  Neuroscience Intensive Care     Total additional critical care time of 60 minutes, with > 50% of time spent in direct contact with patient/family for education, counseling and coordination of care.

## 2024-10-15 LAB
ALBUMIN SERPL BCP-MCNC: 4.3 G/DL (ref 3.4–5)
ANION GAP SERPL CALC-SCNC: 13 MMOL/L (ref 10–20)
APTT PPP: 29 SECONDS (ref 27–38)
BUN SERPL-MCNC: 11 MG/DL (ref 6–23)
CALCIUM SERPL-MCNC: 9.1 MG/DL (ref 8.6–10.6)
CHLORIDE SERPL-SCNC: 105 MMOL/L (ref 98–107)
CO2 SERPL-SCNC: 26 MMOL/L (ref 21–32)
CREAT SERPL-MCNC: 0.84 MG/DL (ref 0.5–1.05)
EGFRCR SERPLBLD CKD-EPI 2021: >90 ML/MIN/1.73M*2
ERYTHROCYTE [DISTWIDTH] IN BLOOD BY AUTOMATED COUNT: 12.2 % (ref 11.5–14.5)
GLUCOSE BLD MANUAL STRIP-MCNC: 126 MG/DL (ref 74–99)
GLUCOSE BLD MANUAL STRIP-MCNC: 133 MG/DL (ref 74–99)
GLUCOSE BLD MANUAL STRIP-MCNC: 156 MG/DL (ref 74–99)
GLUCOSE BLD MANUAL STRIP-MCNC: 157 MG/DL (ref 74–99)
GLUCOSE BLD MANUAL STRIP-MCNC: 158 MG/DL (ref 74–99)
GLUCOSE SERPL-MCNC: 131 MG/DL (ref 74–99)
HCT VFR BLD AUTO: 34.8 % (ref 36–46)
HGB BLD-MCNC: 12 G/DL (ref 12–16)
INR PPP: 1.1 (ref 0.9–1.1)
MAGNESIUM SERPL-MCNC: 1.67 MG/DL (ref 1.6–2.4)
MCH RBC QN AUTO: 29.3 PG (ref 26–34)
MCHC RBC AUTO-ENTMCNC: 34.5 G/DL (ref 32–36)
MCV RBC AUTO: 85 FL (ref 80–100)
NRBC BLD-RTO: 0 /100 WBCS (ref 0–0)
PHOSPHATE SERPL-MCNC: 2.8 MG/DL (ref 2.5–4.9)
PLATELET # BLD AUTO: 264 X10*3/UL (ref 150–450)
POTASSIUM SERPL-SCNC: 3.5 MMOL/L (ref 3.5–5.3)
PROTHROMBIN TIME: 12.2 SECONDS (ref 9.8–12.8)
RBC # BLD AUTO: 4.1 X10*6/UL (ref 4–5.2)
SODIUM SERPL-SCNC: 140 MMOL/L (ref 136–145)
WBC # BLD AUTO: 19.7 X10*3/UL (ref 4.4–11.3)

## 2024-10-15 PROCEDURE — 97165 OT EVAL LOW COMPLEX 30 MIN: CPT | Mod: GO

## 2024-10-15 PROCEDURE — 2500000004 HC RX 250 GENERAL PHARMACY W/ HCPCS (ALT 636 FOR OP/ED)

## 2024-10-15 PROCEDURE — 82947 ASSAY GLUCOSE BLOOD QUANT: CPT

## 2024-10-15 PROCEDURE — 2500000002 HC RX 250 W HCPCS SELF ADMINISTERED DRUGS (ALT 637 FOR MEDICARE OP, ALT 636 FOR OP/ED): Performed by: REGISTERED NURSE

## 2024-10-15 PROCEDURE — 37799 UNLISTED PX VASCULAR SURGERY: CPT | Performed by: STUDENT IN AN ORGANIZED HEALTH CARE EDUCATION/TRAINING PROGRAM

## 2024-10-15 PROCEDURE — 97530 THERAPEUTIC ACTIVITIES: CPT | Mod: GP

## 2024-10-15 PROCEDURE — 97161 PT EVAL LOW COMPLEX 20 MIN: CPT | Mod: GP

## 2024-10-15 PROCEDURE — 1170000001 HC PRIVATE ONCOLOGY ROOM DAILY

## 2024-10-15 PROCEDURE — 85027 COMPLETE CBC AUTOMATED: CPT | Performed by: STUDENT IN AN ORGANIZED HEALTH CARE EDUCATION/TRAINING PROGRAM

## 2024-10-15 PROCEDURE — 2500000004 HC RX 250 GENERAL PHARMACY W/ HCPCS (ALT 636 FOR OP/ED): Performed by: REGISTERED NURSE

## 2024-10-15 PROCEDURE — 2500000001 HC RX 250 WO HCPCS SELF ADMINISTERED DRUGS (ALT 637 FOR MEDICARE OP): Performed by: STUDENT IN AN ORGANIZED HEALTH CARE EDUCATION/TRAINING PROGRAM

## 2024-10-15 PROCEDURE — 2500000001 HC RX 250 WO HCPCS SELF ADMINISTERED DRUGS (ALT 637 FOR MEDICARE OP): Performed by: REGISTERED NURSE

## 2024-10-15 PROCEDURE — 83735 ASSAY OF MAGNESIUM: CPT | Performed by: STUDENT IN AN ORGANIZED HEALTH CARE EDUCATION/TRAINING PROGRAM

## 2024-10-15 PROCEDURE — 2500000004 HC RX 250 GENERAL PHARMACY W/ HCPCS (ALT 636 FOR OP/ED): Performed by: STUDENT IN AN ORGANIZED HEALTH CARE EDUCATION/TRAINING PROGRAM

## 2024-10-15 PROCEDURE — 97530 THERAPEUTIC ACTIVITIES: CPT | Mod: GO

## 2024-10-15 PROCEDURE — 85610 PROTHROMBIN TIME: CPT | Performed by: STUDENT IN AN ORGANIZED HEALTH CARE EDUCATION/TRAINING PROGRAM

## 2024-10-15 PROCEDURE — 80069 RENAL FUNCTION PANEL: CPT | Performed by: STUDENT IN AN ORGANIZED HEALTH CARE EDUCATION/TRAINING PROGRAM

## 2024-10-15 RX ORDER — CALCIUM GLUCONATE 20 MG/ML
2 INJECTION, SOLUTION INTRAVENOUS EVERY 6 HOURS PRN
Status: DISCONTINUED | OUTPATIENT
Start: 2024-10-15 | End: 2024-10-15

## 2024-10-15 RX ORDER — MAGNESIUM SULFATE HEPTAHYDRATE 40 MG/ML
4 INJECTION, SOLUTION INTRAVENOUS EVERY 6 HOURS PRN
Status: DISCONTINUED | OUTPATIENT
Start: 2024-10-15 | End: 2024-10-15

## 2024-10-15 RX ORDER — CALCIUM GLUCONATE 20 MG/ML
1 INJECTION, SOLUTION INTRAVENOUS EVERY 6 HOURS PRN
Status: DISCONTINUED | OUTPATIENT
Start: 2024-10-15 | End: 2024-10-15

## 2024-10-15 RX ORDER — POTASSIUM CHLORIDE 1.5 G/1.58G
40 POWDER, FOR SOLUTION ORAL EVERY 6 HOURS PRN
Status: DISCONTINUED | OUTPATIENT
Start: 2024-10-15 | End: 2024-10-15

## 2024-10-15 RX ORDER — POTASSIUM CHLORIDE 20 MEQ/1
40 TABLET, EXTENDED RELEASE ORAL EVERY 6 HOURS PRN
Status: DISCONTINUED | OUTPATIENT
Start: 2024-10-15 | End: 2024-10-15

## 2024-10-15 RX ORDER — POTASSIUM CHLORIDE 20 MEQ/1
40 TABLET, EXTENDED RELEASE ORAL ONCE
Status: COMPLETED | OUTPATIENT
Start: 2024-10-15 | End: 2024-10-15

## 2024-10-15 RX ORDER — DEXAMETHASONE 4 MG/1
8 TABLET ORAL EVERY 8 HOURS SCHEDULED
Status: DISCONTINUED | OUTPATIENT
Start: 2024-10-15 | End: 2024-10-17

## 2024-10-15 RX ORDER — MAGNESIUM SULFATE HEPTAHYDRATE 40 MG/ML
2 INJECTION, SOLUTION INTRAVENOUS EVERY 6 HOURS PRN
Status: DISCONTINUED | OUTPATIENT
Start: 2024-10-15 | End: 2024-10-15

## 2024-10-15 RX ORDER — POTASSIUM CHLORIDE 20 MEQ/1
20 TABLET, EXTENDED RELEASE ORAL EVERY 6 HOURS PRN
Status: DISCONTINUED | OUTPATIENT
Start: 2024-10-15 | End: 2024-10-15

## 2024-10-15 RX ORDER — POTASSIUM CHLORIDE 1.5 G/1.58G
20 POWDER, FOR SOLUTION ORAL EVERY 6 HOURS PRN
Status: DISCONTINUED | OUTPATIENT
Start: 2024-10-15 | End: 2024-10-15

## 2024-10-15 ASSESSMENT — PAIN DESCRIPTION - DESCRIPTORS
DESCRIPTORS: ACHING
DESCRIPTORS: SORE
DESCRIPTORS: ACHING
DESCRIPTORS: SORE
DESCRIPTORS: SORE
DESCRIPTORS: DISCOMFORT;PRESSURE
DESCRIPTORS: HEADACHE

## 2024-10-15 ASSESSMENT — ACTIVITIES OF DAILY LIVING (ADL)
ADL_ASSISTANCE: INDEPENDENT
ADL_ASSISTANCE: INDEPENDENT

## 2024-10-15 ASSESSMENT — COGNITIVE AND FUNCTIONAL STATUS - GENERAL
DAILY ACTIVITIY SCORE: 21
MOBILITY SCORE: 17
TURNING FROM BACK TO SIDE WHILE IN FLAT BAD: A LITTLE
DRESSING REGULAR LOWER BODY CLOTHING: A LITTLE
WALKING IN HOSPITAL ROOM: A LITTLE
HELP NEEDED FOR BATHING: A LITTLE
CLIMB 3 TO 5 STEPS WITH RAILING: TOTAL
STANDING UP FROM CHAIR USING ARMS: A LITTLE
TOILETING: A LITTLE
MOVING TO AND FROM BED TO CHAIR: A LITTLE

## 2024-10-15 ASSESSMENT — PAIN SCALES - GENERAL
PAINLEVEL_OUTOF10: 6
PAINLEVEL_OUTOF10: 4
PAINLEVEL_OUTOF10: 0 - NO PAIN
PAINLEVEL_OUTOF10: 7
PAINLEVEL_OUTOF10: 2
PAINLEVEL_OUTOF10: 5 - MODERATE PAIN
PAINLEVEL_OUTOF10: 4
PAINLEVEL_OUTOF10: 5 - MODERATE PAIN
PAINLEVEL_OUTOF10: 0 - NO PAIN
PAINLEVEL_OUTOF10: 7
PAINLEVEL_OUTOF10: 0 - NO PAIN
PAINLEVEL_OUTOF10: 7
PAINLEVEL_OUTOF10: 0 - NO PAIN
PAINLEVEL_OUTOF10: 5 - MODERATE PAIN
PAINLEVEL_OUTOF10: 2
PAINLEVEL_OUTOF10: 7
PAINLEVEL_OUTOF10: 0 - NO PAIN

## 2024-10-15 ASSESSMENT — PAIN - FUNCTIONAL ASSESSMENT
PAIN_FUNCTIONAL_ASSESSMENT: 0-10
PAIN_FUNCTIONAL_ASSESSMENT: CPOT (CRITICAL CARE PAIN OBSERVATION TOOL)
PAIN_FUNCTIONAL_ASSESSMENT: 0-10
PAIN_FUNCTIONAL_ASSESSMENT: CPOT (CRITICAL CARE PAIN OBSERVATION TOOL)
PAIN_FUNCTIONAL_ASSESSMENT: 0-10

## 2024-10-15 ASSESSMENT — PAIN DESCRIPTION - LOCATION: LOCATION: HEAD

## 2024-10-15 ASSESSMENT — PAIN DESCRIPTION - ORIENTATION: ORIENTATION: RIGHT

## 2024-10-15 NOTE — PROGRESS NOTES
Social Work Discharge Planning note:    -Patient discussed during interdisciplinary rounds.   -Team members present: PA, SHAVONNE, and SW  -Plan per medical team: Pt is not medically ready for discharge. Pt is to transfer to ENT service in [facility].   -Payer: [facility] Medicaid  -Status: Inpatient  -Discharge disposition: Pt is currently with PT recommendations for out patient, vestibular rehab. SW met with Pt and her significant other, Jude, to offer support and to further discuss discharge planning. Pt was agreeable with out patient therapy and was given a printed list of out patient therapy providers. Jude reported that they are both college students, and he too withdrew from the semester so that he can stay at home to act as 24 hour care giver to Pt. Jude reported that he will also provide for any needed transportation. Pt and Jude reported there to be no other issues or concerns at this time.   -Potential Barriers: none  -Anticipated Date of Discharge:  10/18/24    RONDA Schroeder, LSW

## 2024-10-15 NOTE — DOCUMENTATION CLARIFICATION NOTE
"    PATIENT:               CHINTAN CHAMPAGNE  ACCT #:                  [id number]  MRN:                       [id number]  :                       1995  ADMIT DATE:       10/14/2024 5:29 AM  DISCH DATE:  RESPONDING PROVIDER #:        [id number]          PROVIDER RESPONSE TEXT:    Morbid obesity    CDI QUERY TEXT:    Clarification      Instruction:    Based on your assessment of the patient and the clinical information, please provide the requested documentation by clicking on the appropriate radio button and enter any additional information if prompted.    Question: Is there a diagnosis associated with a BMI of 43.74  appropriate for this patient    When answering this query, please exercise your independent professional judgment. The fact that a question is being asked, does not imply that any particular answer is desired or expected.    The patient's clinical indicators include:  Clinical Information: 28-yo presented with  acoustic neuroma.    Clinical Indicators:  10/7  HP by MARCO Salcedo-CNP \" Ht 1.6 m (5' 3\")   Wt 112 kg (246 lb 14.6 oz)  BMI 43.74    10/14 by Dr. Hartmann \"H&P reviewed. The patient was examined and there are no changes to the H&P\"    Treatment: Daily weights    Risk Factors: BMI 43.74 , Wt 246 lbs  Options provided:  -- Overweight  -- Obesity  -- Morbid obesity  -- Other - I will add my own diagnosis  -- Refer to Clinical Documentation Reviewer    Query created by: Tigist Avery on 10/15/2024 11:01 AM      Electronically signed by:  SUZAN HARTMANN MD 10/15/2024 11:55 AM          "

## 2024-10-15 NOTE — PROGRESS NOTES
Physical Therapy    Physical Therapy Evaluation & Treatment    Patient Name: Sandy Cao  MRN: [id number]  Department: [facility]  Room: 05/05A  Today's Date: 10/15/2024   Time Calculation  Start Time: 0917  Stop Time: 1001  Time Calculation (min): 44 min    Assessment/Plan   PT Assessment  PT Assessment Results: Decreased mobility, Impaired vision, Pain  Rehab Prognosis: Excellent  Barriers to Discharge: Medical Acuity  Evaluation/Treatment Tolerance: Patient tolerated treatment well  Strengths: Support of Caregivers  Barriers to Participation: Comorbidities  End of Session Communication: Bedside nurse  Assessment Comment: 28yr F patient presents s/p translabyrinthine resection of right-sided acoustic neuroma. During examination patient has deficits in vision and mobility. Would benefit from continued skilled PT while admitted to maximize patient functional mobilioty and safety. Recommending home with [facility].  End of Session Patient Position: Up in chair, Alarm on   IP OR SWING BED PT PLAN  Inpatient or Swing Bed: Inpatient  PT Plan  Treatment/Interventions: Bed mobility, Transfer training, Gait training, Balance training, Neuromuscular re-education, Strengthening, Range of motion, Therapeutic exercise, Therapeutic activity, Home exercise program, Postural re-education, Positioning  PT Plan: Ongoing PT  PT Frequency: 3 times per week  PT Discharge Recommendations: Low intensity level of continued care, Other (comment) ([facility])  PT Recommended Transfer Status: Contact guard  PT - OK to Discharge: Yes (Once medically stable)      Subjective     General Visit Information:  General  Reason for Referral: Admitted 10/14/2024 with Acoustic neuroma (Multi) after presenting with Right-sided hearing loss and severe SNHL. MRI IAC showed a 3.2cm acoustic neuroma. Planned surgical resection of Right sided acoustic neuroma in the right internal auditory canal and CPA. Now - s/p translabyrinthine  resection of right-sided acoustic neuroma  Past Medical History Relevant to Rehab: Anxiety, Depression  Missed Visit: No  Family/Caregiver Present: Yes  Caregiver Feedback: Fiance present and supportive throughout session  Prior to Session Communication: Bedside nurse  Patient Position Received: Bed, 3 rail up, Alarm on  General Comment: Patient is agreeable to work with therapy  Home Living:  Home Living  Type of Home: House  Lives With: Significant other, Grandparent(s)  Home Adaptive Equipment: None  Home Layout: Two level, Laundry main level, Full bath main level  Home Access: No concerns  Bathroom Shower/Tub: Tub/shower unit  Bathroom Toilet: Standard  Bathroom Equipment: None  Home Living Comments: No basement  Prior Level of Function:  Prior Function Per Pt/Caregiver Report  Level of [place]: Independent with ADLs and functional transfers, Independent with homemaking with ambulation  Receives Help From: Family (As needed)  ADL Assistance: Independent  Homemaking Assistance: Independent  Ambulatory Assistance: Independent  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)  Leisure: Enjoys playing video games and fishing  Hand Dominance: Right  Prior Function Comments: (+) drives; no falls reported  Precautions:  Precautions  Medical Precautions: Fall precautions, Oxygen therapy device and L/min (A line; 2L NC O2)  Precautions Comment: Head wrap donned    Vital Signs (Past 2hrs)        Date/Time Vitals Session Patient Position Pulse Resp SpO2 BP MAP (mmHg)    10/15/24 0900 --  --  77  11  97 %  143/86  97     10/15/24 0917 Pre PT  --  104  15  98 %  143/65  87     10/15/24 1000 --  --  95  19  100 %  140/80  120     10/15/24 1001 Post PT  --  106  --  100 %  140/80  --                     Objective   Pain:  Pain Assessment  Pain Assessment: 0-10  0-10 (Numeric) Pain Score: 4  Pain Type: Acute pain  Pain Location: Back  Pain Orientation: Lower  Pain Descriptors: Sore  Pain Frequency:  Constant/continuous  Multiple Pain Sites: Two  Pain 2  Pain Score 2: 7  Pain Type 2: Surgical pain  Pain Location 2: Head  Pain Descriptors 2: Headache  Pain Frequency 2: Constant/continuous  Cognition:  Cognition  Overall Cognitive Status: Within Functional Limits  Orientation Level: Oriented X4  Attention: Within Functional Limits  Safety/Judgement: Within Functional Limits  Insight: Within function limits  Impulsive: Within functional limits  Processing Speed: Within funtional limits    General Assessments:     Activity Tolerance  Endurance: Endurance does not limit participation in activity  Early Mobility/Exercise Safety Screen: Proceed with mobilization - No exclusion criteria met    Sensation  Light Touch: No apparent deficits       Perception  Inattention/Neglect: Appears intact  Initiation: Appears intact  Motor Planning: Appears intact  Perseveration: Not present      Coordination  Movements are Fluid and Coordinated: Yes  Coordination Comment: UE/LE functional coordination intact seen via bed mobility and transfers    Postural Control  Postural Control: Within Functional Limits  Posture Comment: Fwd head, rounded shoulders, increased thoracic kyphosis    Static Sitting Balance  Static Sitting-Balance Support: Feet supported, Bilateral upper extremity supported  Static Sitting-Level of Assistance: Close supervision  Dynamic Sitting Balance  Dynamic Sitting-Balance Support: Feet supported  Dynamic Sitting-Level of Assistance: Close supervision  Dynamic Sitting-Balance: Trunk control activities    Static Standing Balance  Static Standing-Balance Support: Right upper extremity supported (HHA)  Static Standing-Level of Assistance: Contact guard  Static Standing-Comment/Number of Minutes: VC/TC for proper sequencing/positioning  Dynamic Standing Balance  Dynamic Standing-Balance Support: Right upper extremity supported (HHA)  Dynamic Standing-Level of Assistance: Contact guard  Dynamic Standing-Balance:  Turning  Dynamic Standing-Comments: VC/TC for proper sequencing/positioning  Functional Assessments:  Bed Mobility  Bed Mobility: Yes  Bed Mobility 1  Bed Mobility 1: Supine to sitting  Level of Assistance 1: Contact guard, Minimal verbal cues  Bed Mobility Comments 1: VC/TC for proper sequencing/positioning    Transfers  Transfer: Yes  Transfer 1  Transfer From 1: Sit to  Transfer to 1: Chair with arms  Technique 1: To right  Transfer Level of Assistance 1: Contact guard  Trials/Comments 1: 2-3 side steps to the chair and 2 bwd steps with VC for proper sequencing/positioning  Transfers 2  Transfer From 2: Sit to, Stand to  Transfer to 2: Sit, Stand  Technique 2: Sit to stand, Stand to sit  Transfer Level of Assistance 2: Contact guard  Trials/Comments 2: VC/TC for proper sequencing/positioning  Extremity/Trunk Assessments:  R/L UE : WFL   R/L UE strength: WFL - as seen with functional movement     RLE   RLE : Exceptions to WFL  AROM RLE (degrees)  RLE AROM Comment: WFL  Strength RLE  R Hip Flexion: 5/5  R Hip ABduction: 5/5  R Hip ADduction: 5/5  R Knee Flexion: 5/5  R Knee Extension: 5/5  R Ankle Dorsiflexion: 5/5  R Ankle Plantar Flexion: 5/5  LLE   LLE : Exceptions to WFL  AROM LLE (degrees)  LLE AROM Comment: WFL  Strength LLE  L Hip Flexion: 5/5  L Hip ABduction: 5/5  L Hip ADduction: 5/5  L Knee Flexion: 5/5  L Knee Extension: 5/5  L Ankle Dorsiflexion: 5/5  L Ankle Plantar Flexion: 5/5  Treatments:  Therapeutic Activity  Therapeutic Activity Performed: Yes  Therapeutic Activity 1: Pt edu on PT POC, Goals of PT, Rehab, and OP vestibular rehab  Therapeutic Activity 2: Skilled monitoring of vitals during session  Therapeutic Activity 3: Proper patient positioning with pillows to offweight pressure and prevent pessure wounds  Outcome Measures:  [facility] Basic Mobility  Turning from your back to your side while in a flat bed without using bedrails: None  Moving from lying on your back to sitting on the side of a  flat bed without using bedrails: A little  Moving to and from bed to chair (including a wheelchair): A little  Standing up from a chair using your arms (e.g. wheelchair or bedside chair): A little  To walk in hospital room: A little  Climbing 3-5 steps with railing: Total  Basic Mobility - Total Score: 17    FSS-ICU  Ambulation: Walks <50 feet with any assistance x1 or walks any distance with assistance x2 people  Rolling: Supervision or set-up only  Sitting: Minimal assistance (performs 75% or more of task)  Transfer Sit-to-Stand: Minimal assistance (performs 75% or more of task)  Transfer Supine-to-Sit: Minimal assistance (performs 75% or more of task)  Total Score: 18      Early Mobility/Exercise Safety Screen: Proceed with mobilization - No exclusion criteria met  ICU Mobility Scale: Transferring bed to chair [5]    Encounter Problems       Encounter Problems (Active)       PT Problem       Bed Mobility:  Patient will perform bed mobility at independent to improve functional mobility and maximize patient safety.          Start:  10/15/24    Expected End:  10/29/24            Transfers: Patient will perform functional transfers at independent to improve functional mobility and maximize patient safety.          Start:  10/15/24    Expected End:  10/29/24            Tinnetti: pt will score > 20 to reduce fall risk and promote safety and functional mobility          Start:  10/15/24    Expected End:  10/29/24            Gait: Pt will ambulate 100ft with no device at independent with no LOB.         Start:  10/15/24    Expected End:  10/29/24               Pain - Adult              Education Documentation  Precautions, taught by Christy Moore PT at 10/15/2024 10:41 AM.  Learner: Family, Patient  Readiness: Acceptance  Method: Explanation, Demonstration  Response: Verbalizes Understanding, Demonstrated Understanding    Body Mechanics, taught by Christy Moore PT at 10/15/2024 10:41 AM.  Learner: Family,  Patient  Readiness: Acceptance  Method: Explanation, Demonstration  Response: Verbalizes Understanding, Demonstrated Understanding    Mobility Training, taught by Christy Moore, PT at 10/15/2024 10:41 AM.  Learner: Family, Patient  Readiness: Acceptance  Method: Explanation, Demonstration  Response: Verbalizes Understanding, Demonstrated Understanding    Education Comments  No comments found.      10/15/24 at 10:45 AM - Christy Moore, PT

## 2024-10-15 NOTE — CARE PLAN
Problem: Pain - Adult  Goal: Verbalizes/displays adequate comfort level or baseline comfort level  Outcome: Progressing     Problem: Safety - Adult  Goal: Free from fall injury  Outcome: Progressing     Problem: Chronic Conditions and Co-morbidities  Goal: Patient's chronic conditions and co-morbidity symptoms are monitored and maintained or improved  Outcome: Progressing     Problem: Skin  Goal: Participates in plan/prevention/treatment measures  Outcome: Progressing  Goal: Promote/optimize nutrition  Outcome: Progressing     Problem: Pain  Goal: Takes deep breaths with improved pain control throughout the shift  Outcome: Progressing  Goal: Turns in bed with improved pain control throughout the shift  Outcome: Progressing  Goal: Free from opioid side effects throughout the shift  Outcome: Progressing  Goal: Free from acute confusion related to pain meds throughout the shift  Outcome: Progressing     Problem: Fall/Injury  Goal: Not fall by end of shift  Outcome: Progressing  Goal: Be free from injury by end of the shift  Outcome: Progressing

## 2024-10-15 NOTE — PROGRESS NOTES
"Sandy Cao is a 28 y.o. female on day 1 of admission presenting with Acoustic neuroma (Multi).     Subjective   No major acute events overnight. Reports anxiety and diplopia       Objective     Physical Exam  Constitutional:       Appearance: She is well-developed and well-nourished.   HENT:      Head: Normocephalic.   Psychiatric:         Mood and Affect: Mood is anxious.       ENT Physical Exam  Constitutional  Appearance: patient appears well-developed and well-nourished,  Communication/Voice: communication appropriate for developmental age;  Head and Face  Appearance: head appears normal and face appears normal;  Ear  Hearing: impaired to conversational voice;  Neurovestibular  Psychiatric: mood anxious;  Mastoid dressing in place, covering the right ear, HB 1/6  AC>BC on the left, unable to assess the right 2/2 mastoid dressing in place  Breathing well on RA    Last Recorded Vitals  Blood pressure 116/82, pulse 88, temperature 35.2 °C (95.4 °F), resp. rate 13, height 1.6 m (5' 3\"), weight 109 kg (239 lb 13.8 oz), SpO2 95%.  Intake/Output last 3 Shifts:  I/O last 3 completed shifts:  In: 2024 (18.6 mL/kg) [I.V.:324 (3 mL/kg); IV Piggyback:1700]  Out: 2380 (21.9 mL/kg) [Urine:2250 (0.6 mL/kg/hr); Other:30; Blood:100]  Weight: 108.8 kg     Relevant Results        Scheduled medications  ceFAZolin, 2 g, intravenous, q12h  dexAMETHasone, 8 mg, intravenous, q8h  insulin lispro, 0-5 Units, subcutaneous, Before meals & nightly  melatonin, 5 mg, oral, Nightly  pantoprazole, 40 mg, oral, Daily before breakfast  polyethylene glycol, 17 g, oral, Daily  sennosides, 2 tablet, oral, BID      Continuous medications     PRN medications  PRN medications: acetaminophen, calcium gluconate, calcium gluconate, dextrose, dextrose, glucagon, glucagon, hydrALAZINE, HYDROmorphone, hydrOXYzine HCL, labetaloL, magnesium sulfate, magnesium sulfate, metoclopramide **OR** metoclopramide, ondansetron **OR** ondansetron, oxyCODONE, " oxyCODONE, potassium chloride CR **OR** potassium chloride, potassium chloride CR **OR** potassium chloride                         Assessment/Plan   Assessment & Plan  Acoustic neuroma (Multi)    Sensorineural hearing loss (SNHL) of right ear with unrestricted hearing of left ear    29y/o female with a history of a right vestibular schwannoma, now OR s/p right translabyrinthine approach to removal, abdominal fat graft  and lumbar drain placement, followed by removal with Sheri Conrad and Eddie.      -Analgesia:  Tylenol, Oxycodone and Dilaudid prn  -FEN: Regular diet, HLIV,  monitor and replete electrolytes as needed  -Pulm: Wean oxygen to room air, incentive spirometer 10x/hr while awake  -ID: Ancef x 48 hours (to end 10/16)  -Cardiac: No active issues  -Heme: leukocytosis to 19.7, will continue to monitor, Monitor H/H, daily CBC  -GI: Bowel regimen, PPI,  and PRN anti-emetic  -: Aguirre dc'd 10/15, voiding spontaneously  -Steroids/Special: Decadron 8q8 to end 10/16, mastoid dressing to come down 10/16  -Embolic PPx: SQH, SCDs while in bed  -Dispo: In NSU, to transfer to [address]             Maday Oliva MD

## 2024-10-15 NOTE — CONSULTS
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 Ophthalmology consulted for diplopia.    Patient reports that prior to surgery she had no visual complaints or diplopia; no prior ocular history. Since waking from surgery, she has had binocular diplopia (resolves with either eye closed). She is not sure if worse at near or distance, but thinks maybe at near. The diplopia is both horizontal and vertical. She feels nauseous when she keeps both eyes open and has started intermittent patching which has helped. No eye pain, blurred vision, flashes or floaters. She denies pulsatile tinnitus, TVOs.      ROS: Patient denies pain, redness, discharge, decreased vision, blind spots, flashes, or floaters. All other systems have been reviewed and are negative.    PMHx: please refer to admission HPI  Medications: please refer to medication reconciliation  Allergies: please refer to patient allergy list  Past Ocular History: as per above HPI  Family History: reviewed and noncontributory to chief ophthalmic complaint  Orientation: Alert and oriented x3, appropriate mood and behavior    Examination:     Base Eye Exam       Visual Acuity (Snellen - Linear)         Right Left    Near sc 20/40-1 ph 20/25-1 20/20-2              Tonometry (Tonopen, 11:28 AM)         Right Left    Pressure 16 21              Pupils         Dark Light Shape React APD    Right 4 2 Round Brisk None    Left 4 2 Round Brisk None              Visual Fields         Left Right     Full Full              Extraocular Movement         Right Left     Full Full                  Additional Tests       Color         Right Left    Ishihara 14/14 14/14                  Strabismus Exam       Reading #1   (Edited by: Yuri Camacho MD)       Method: NSU near without glasses      Distance Near Near +3DS N Bifocals                      - - - - - -   - - - - - -                       - -  - -  RHT 10 - -  - -            XT 7           - - - - - -   - - - - - -                           Reading #2   (Edited by: Yuri Camacho MD)      Method: NSU distance without glasses      Distance Near Near +3DS N Bifocals                            Comments    Patient refused due to nausea                 Slit Lamp and Fundus Exam       External Exam         Right Left    External Normal Normal              Slit Lamp Exam         Right Left    Lids/Lashes Normal Normal    Conjunctiva/Sclera White and quiet White and quiet    Cornea Clear Clear    Anterior Chamber Deep and quiet Deep and quiet    Iris Round and reactive Round and reactive    Lens Clear Clear    Anterior Vitreous Normal Normal              Fundus Exam         Right Left    Disc Grade 2 edema Grade 1 edema    Macula Normal Normal    Vessels Normal Normal    Periphery Normal Normal                    Imaging:  10/15/24 CT Head wo contrast:  IMPRESSION:  Postoperative changes as noted. A 1 cm collection of mildly  hyperdense tissue versus hemorrhage lies along the medial aspect of the surgical bed abutting the veronica.    08/06/24 MRI IAC w and wo contrast:  IMPRESSION:  3.2 cm enhancing mass within the right internal auditory canal and right cerebellopontine angle region likely corresponding to acoustic neuroma/vestibular schwannoma. There is no definite evidence of extension to the basal turn of the cochlea. There is mass effect on the veronica, cerebellum, and right brachium pontis without vasogenic edema. There is also mild mass effect along the rightward aspect of the 4th ventricle with no evidence of hydrocephalus.      No acute infarct, recent hemorrhage, or additional abnormal  enhancement or intracranial mass effect.      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 Ophthalmology consulted for diplopia.    The examination is consistent with binocular diplopia in the setting of post-operative surgical changes. There is likely a component of skew deviation with RHT as well as an exotropia from post-operative changes around the area of the veronica on imaging. We discussed patching for symptomatic relief and gradual resolution in symptoms over time. Prism and future strabismus surgery were also discussed as options down the line.    There is also low grade disc edema, right worse than left, noted on exam. I suspect this is likely from changes in intracranial pressure from her surgery on 10/14/24. While she does have risk factors for idiopathic intracranial hypertension, this would be less likely given lack of supporting symptoms such as pulsatile tinnitus TVOs, headache or diplopia prior to surgery. Will alert neurosurgery to these examination findings.    Plan  - Findings of optic disc edema relayed directly to neurosurgery  - Continue patching for symptomatic relief of diplopia  - Will arrange outpatient follow up with neuro-ophthalmology      Discussed with neuro-ophthalmology attending, Dr. Dharmesh Garcia MD PhD.    Yuri Camacho MD  Ophthalmology PGY-3      Ophthalmology Adult Pager - [phone]  Ophthalmology Pediatrics Pager - [phone]    For adult follow-up appointments, call: [phone]  For pediatric follow-up appointments, call: [phone]      NOTE: This note is not finalized until attending reviews and signs.

## 2024-10-15 NOTE — PROGRESS NOTES
Sandy Cao is a 28 y.o. female on day 1 of admission presenting with Acoustic neuroma (Multi).    Subjective   NAEO. Doing well this AM        Objective     Physical Exam  Awake  Ox3  Ou3R  FS  TM  BUE 5  BLE 5  R sided HL   Incision covered with head wrap     Last Recorded Vitals  Blood pressure 143/80, pulse (!) 114, temperature 36.5 °C (97.7 °F), temperature source Temporal, resp. rate 26, SpO2 99%.  Intake/Output last 3 Shifts:  I/O last 3 completed shifts:  In: 1774 [I.V.:274; IV Piggyback:1500]  Out: 1805 [Urine:1675; Other:30; Blood:100]    Relevant Results                 Results for orders placed or performed during the hospital encounter of 10/14/24 (from the past 24 hour(s))   Type and screen   Result Value Ref Range    ABO TYPE O     Rh TYPE POS     ANTIBODY SCREEN NEG    VERIFY ABO/Rh Group Test   Result Value Ref Range    ABO TYPE O     Rh TYPE POS    POCT GLUCOSE   Result Value Ref Range    POCT Glucose 209 (H) 74 - 99 mg/dL   Renal Function Panel   Result Value Ref Range    Glucose 206 (H) 74 - 99 mg/dL    Sodium 138 136 - 145 mmol/L    Potassium 3.8 3.5 - 5.3 mmol/L    Chloride 103 98 - 107 mmol/L    Bicarbonate 21 21 - 32 mmol/L    Anion Gap 18 10 - 20 mmol/L    Urea Nitrogen 11 6 - 23 mg/dL    Creatinine 0.93 0.50 - 1.05 mg/dL    eGFR 87 >60 mL/min/1.73m*2    Calcium 9.3 8.6 - 10.6 mg/dL    Phosphorus 2.2 (L) 2.5 - 4.9 mg/dL    Albumin 4.4 3.4 - 5.0 g/dL   CBC   Result Value Ref Range    WBC 20.8 (H) 4.4 - 11.3 x10*3/uL    nRBC 0.0 0.0 - 0.0 /100 WBCs    RBC 4.30 4.00 - 5.20 x10*6/uL    Hemoglobin 12.3 12.0 - 16.0 g/dL    Hematocrit 34.7 (L) 36.0 - 46.0 %    MCV 81 80 - 100 fL    MCH 28.6 26.0 - 34.0 pg    MCHC 35.4 32.0 - 36.0 g/dL    RDW 12.0 11.5 - 14.5 %    Platelets 294 150 - 450 x10*3/uL   Magnesium   Result Value Ref Range    Magnesium 1.49 (L) 1.60 - 2.40 mg/dL   Coagulation Screen   Result Value Ref Range    Protime 12.1 9.8 - 12.8 seconds    INR 1.1 0.9 - 1.1    aPTT 25 (L) 27 - 38  seconds   POCT GLUCOSE   Result Value Ref Range    POCT Glucose 187 (H) 74 - 99 mg/dL                  Assessment/Plan   Assessment & Plan  Acoustic neuroma (Multi)    Sensorineural hearing loss (SNHL) of right ear with unrestricted hearing of left ear    28F with h/o asthma, depression who presents with R tinnitus found to have R CP angle tumor, 10/14 s/p R translab, abd fat graft, LD placement and removal, CTH POC     Floor, S5 transfer  SBP<160  ENT recs  AM labs   final path  PTOT         Estefanía Siddiqui MD

## 2024-10-15 NOTE — PROGRESS NOTES
Occupational Therapy    Evaluation/Treatment    Patient Name: Sandy Cao  MRN: [id number]  Department: [facility]  Room: 05/05-A  Today's Date: 10/15/24  Time Calculation  Start Time: 1033  Stop Time: 1105  Time Calculation (min): 32 min       Assessment:  OT Assessment: Pt demonstrated deficits in ADL/IADL participation, transfers, functional mobility, endurance, and fine/gross motor control. Pt would benefit from skilled OT services to address deficits and increase occupational performance for a safe return home.  Prognosis: Good  Barriers to Discharge: None  Evaluation/Treatment Tolerance: Patient tolerated treatment well  Medical Staff Made Aware: Yes  End of Session Communication: Bedside nurse  End of Session Patient Position: Up in chair, Alarm on  OT Assessment Results: Decreased ADL status, Decreased endurance, Decreased fine motor control, Decreased functional mobility, Decreased gross motor control, Decreased IADLs  Prognosis: Good  Barriers to Discharge: None  Evaluation/Treatment Tolerance: Patient tolerated treatment well  Medical Staff Made Aware: Yes  Strengths: Attitude of self, Ability to acquire knowledge, Support and attitude of living partners, Support of extended family/friends  Barriers to Participation: Comorbidities  Plan:  Treatment Interventions: ADL retraining, Functional transfer training, Endurance training, Patient/family training, Neuromuscular reeducation, Fine motor coordination activities, Compensatory technique education  OT Frequency: 3 times per week  OT Discharge Recommendations: Low intensity level of continued care  OT Recommended Transfer Status:  (CGA)  OT - OK to Discharge: Yes  Treatment Interventions: ADL retraining, Functional transfer training, Endurance training, Patient/family training, Neuromuscular reeducation, Fine motor coordination activities, Compensatory technique education    Subjective   Current Problem:  1. Acoustic neuroma (Multi)  Surgical Pathology Exam     Surgical Pathology Exam      2. 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 s/p R translab, abd fat graft, LD placement and removal (10/14).  Past Medical History Relevant to Rehab: asthma, depression  Missed Visit: No  Family/Caregiver Present: Yes  Caregiver Feedback: Fiance present and supportive throughout session  Prior to Session Communication: Bedside nurse  Patient Position Received: Up in chair, Alarm on  General Comment: Pt was pleasant and agreeable. Pt with A-line and external catheter  Precautions:  Hearing/Visual Limitations: Hearing WFL in L ear. Pt reported limited hearing in R ear. Vision WFL. Pt wears glasses full time. Pt wore eye patch on R eye upon entering the room d/t eyes dialated shortly prior to session.  Medical Precautions: Fall precautions  Precautions Comment: SBP <160      Vital Signs     Vitals Session Pre OT During OT Post OT   Heart Rate 101  102   Resp  20  23   SpO2 98  97   /67  121/68   MAP 81  85   ICP                  Pain:  Pain Assessment  Pain Assessment: 0-10  0-10 (Numeric) Pain Score: 0 - No pain    Objective   Cognition:  Overall Cognitive Status: Within Functional Limits  Orientation Level: Oriented X4  Attention: Within Functional Limits  Memory: Within Funtional Limits  Problem Solving: Within Functional Limits  Numeric Reasoning: Within Functional Limits  Abstract Reasoning: Within Functional Limits  Safety/Judgement: Within Functional Limits  Insight: Within function limits  Impulsive: Within functional limits  Processing Speed: Within funtional limits  Cognition Test Scores  Cognition Tests: Cognition Test Performed  SBT Test Score: Pt scored 0/28 which indicates normal cognition (normal =0-4).     Nielsen Agitation Sedation  Scale  Nielsen Agitation Sedation Scale (RASS): Alert and calm  Home Living:  Type of Home: House  Lives With: Significant other, Grandparent(s)  Home Adaptive Equipment: None  Home Layout: One level, Laundry main level, Able to live on main level with bedroom/bathroom  Home Access: Level entry  Bathroom Shower/Tub: Tub/shower unit  Bathroom Toilet: Standard  Bathroom Equipment: Hand-held shower hose, Shower chair with back  Prior Function:  Level of Whittaker: Independent with ADLs and functional transfers, Independent with homemaking with ambulation  ADL Assistance: Independent  Homemaking Assistance: Independent  Ambulatory Assistance: Independent  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)  Leisure: Pt enjoys playing video games  Hand Dominance: Right  Prior Function Comments: (+) drives  IADL History:  Homemaking Responsibilities: Yes  ADL:  Eating Assistance: Independent  Grooming Assistance: Independent  Bathing Assistance:  (Supervision)  Bathing Deficit: Supervision/safety, Steadying  UE Dressing Assistance: Independent  LE Dressing Assistance:  (CGA)  LE Dressing Deficit: Supervision/safety, Steadying  Toileting Assistance with Device:  (CGA)  Toileting Deficit: Supervison/safety, Steadying  Functional Assistance:  (CGA)  Functional Deficit: Supervision/safety, Steadying  Activities of Daily Living: LE Dressing  LE Dressing: Yes  Pants Level of Assistance: Contact guard  Sock Level of Assistance: Close supervision  LE Dressing Where Assessed: Chair  LE Dressing Comments: Pt able to don pants with CGA. Pt required verbal cues to utilize cross over technique. Pt able to doff/don socks on BLE with close supervision for safety. Pt required verbal cues for cross over technique.  Activity Tolerance:  Endurance: Endurance does not limit participation in activity  Early Mobility/Exercise Safety Screen: Proceed with mobilization - No exclusion criteria met  Functional Standing  Tolerance:     Bed Mobility/Transfers: Bed Mobility  Bed Mobility: No    Transfers  Transfer: Yes  Transfer 1  Transfer From 1: Sit to, Stand to  Transfer to 1: Stand, Sit  Transfer Level of Assistance 1: Contact guard  Trials/Comments 1: Pt required verbal cues for sequencing and hand placement.      Functional Mobility:  Functional Mobility  Functional Mobility Performed: Yes  Functional Mobility 1  Surface 1: Level tile  Device 1: No device  Assistance 1: Contact guard  Comments 1: Pt completed functional mobility household distance  Sitting Balance:  Static Sitting Balance  Static Sitting-Level of Assistance: Distant supervision  Dynamic Sitting Balance  Dynamic Sitting-Level of Assistance: Close supervision  Dynamic Sitting-Balance: Forward lean, Lateral lean, Reaching for objects  Standing Balance:  Static Standing Balance  Static Standing-Level of Assistance: Contact guard  Dynamic Standing Balance  Dynamic Standing-Level of Assistance: Contact guard  Dynamic Standing-Balance: Forward lean, Lateral lean, Reaching for objects         Therapy/Activity: Therapeutic Activity  Therapeutic Activity Performed: Yes  Therapeutic Activity 1: Pt completed x2 trials of sit<>stand with CGA. Pt required verbal cues for sequencing and hand placement. Pt completed functional mobility household distances with CGA. Pt required increased time during mobility. Vitals monitored throughout session - vitals stable.       Vision:Vision - Basic Assessment  Current Vision: Wears glasses all the time  Patient Visual Report:  (Pt reported slight blurriness in R eye)   and Vision - Complex Assessment  Tracking: WFL  Visual Fields: WFL  Sensation:  Light Touch: No apparent deficits       Perception:  Inattention/Neglect: Appears intact  Initiation: Appears intact  Motor Planning: Appears intact  Perseveration: Not present  Coordination:  Movements are Fluid and Coordinated: Yes  Finger to Nose: Intact  Rapid Alternating Movements:  Intact  Alternating Toe Taps: Intact  Heel to Barrios: Intact  Coordination Comment: BUE opposition intact   Hand Function:  Hand Function  Gross Grasp: Functional (BUE  strength 5/5)  Coordination: Functional      Outcome Measures: [facility] Daily Activity  Putting on and taking off regular lower body clothing: A little  Bathing (including washing, rinsing, drying): A little  Putting on and taking off regular upper body clothing: None  Toileting, which includes using toilet, bedpan or urinal: A little  Taking care of personal grooming such as brushing teeth: None  Eating Meals: None  Daily Activity - Total Score: 21         and Early Mobility/Exercise Safety Screen: Proceed with mobilization - No exclusion criteria met  ICU Mobility Scale: Walking with assistance of 1 person [8]    Education Documentation  Body Mechanics, taught by TOMASZ Condon at 10/15/2024 11:26 AM.  Learner: Patient  Readiness: Acceptance  Method: Explanation, Demonstration  Response: Verbalizes Understanding, Demonstrated Understanding  Comment: Pt educated on role of OT and OT POC.    Precautions, taught by TOMASZ Condon at 10/15/2024 11:26 AM.  Learner: Patient  Readiness: Acceptance  Method: Explanation, Demonstration  Response: Verbalizes Understanding, Demonstrated Understanding  Comment: Pt educated on role of OT and OT POC.    ADL Training, taught by TOMASZ Condon at 10/15/2024 11:26 AM.  Learner: Patient  Readiness: Acceptance  Method: Explanation, Demonstration  Response: Verbalizes Understanding, Demonstrated Understanding  Comment: Pt educated on role of OT and OT POC.    Education Comments  No comments found.        OP EDUCATION:       Goals:  Encounter Problems       Encounter Problems (Active)       ADLs       Patient will independently perform UB and LB bathing.         Start:  10/15/24            Patient will independently complete lower body dressing donning and doffing all LE clothes.        Start:   10/15/24            Patient will independently complete toileting including hygiene and clothing management.        Start:  10/15/24               BALANCE       Pt will tolerate static/dynamic standing tasks > 20 min independently without LOB during ADL and IADL tasks in order to return to PLOF.        Start:  10/15/24            Pt will independently complete simulated IADL tasks including item retrieval and transport, reaching in various planes, and dynamic standing without loss of balance.        Start:  10/15/24               MOBILITY       Patient will independently perform functional mobility household/community distances in order to improve safety and functional mobility.        Start:  10/15/24               TRANSFERS       Patient will independently perform bed mobility in order to improve safety and independence with mobility.        Start:  10/15/24            Patient will independently complete functional transfers.        Start:  10/15/24

## 2024-10-16 LAB
ALBUMIN SERPL BCP-MCNC: 4.2 G/DL (ref 3.4–5)
ANION GAP SERPL CALC-SCNC: 13 MMOL/L (ref 10–20)
APPEARANCE UR: CLEAR
BILIRUB UR STRIP.AUTO-MCNC: NEGATIVE MG/DL
BUN SERPL-MCNC: 15 MG/DL (ref 6–23)
CALCIUM SERPL-MCNC: 9.2 MG/DL (ref 8.6–10.6)
CHLORIDE SERPL-SCNC: 105 MMOL/L (ref 98–107)
CO2 SERPL-SCNC: 24 MMOL/L (ref 21–32)
COLOR UR: ABNORMAL
CREAT SERPL-MCNC: 0.77 MG/DL (ref 0.5–1.05)
EGFRCR SERPLBLD CKD-EPI 2021: >90 ML/MIN/1.73M*2
ERYTHROCYTE [DISTWIDTH] IN BLOOD BY AUTOMATED COUNT: 12.6 % (ref 11.5–14.5)
GLUCOSE BLD MANUAL STRIP-MCNC: 127 MG/DL (ref 74–99)
GLUCOSE BLD MANUAL STRIP-MCNC: 139 MG/DL (ref 74–99)
GLUCOSE BLD MANUAL STRIP-MCNC: 145 MG/DL (ref 74–99)
GLUCOSE BLD MANUAL STRIP-MCNC: 161 MG/DL (ref 74–99)
GLUCOSE SERPL-MCNC: 127 MG/DL (ref 74–99)
GLUCOSE UR STRIP.AUTO-MCNC: NORMAL MG/DL
HCT VFR BLD AUTO: 37.4 % (ref 36–46)
HGB BLD-MCNC: 12.4 G/DL (ref 12–16)
KETONES UR STRIP.AUTO-MCNC: NEGATIVE MG/DL
LEUKOCYTE ESTERASE UR QL STRIP.AUTO: NEGATIVE
MAGNESIUM SERPL-MCNC: 2.46 MG/DL (ref 1.6–2.4)
MCH RBC QN AUTO: 29 PG (ref 26–34)
MCHC RBC AUTO-ENTMCNC: 33.2 G/DL (ref 32–36)
MCV RBC AUTO: 88 FL (ref 80–100)
MUCOUS THREADS #/AREA URNS AUTO: NORMAL /LPF
NITRITE UR QL STRIP.AUTO: NEGATIVE
NRBC BLD-RTO: 0 /100 WBCS (ref 0–0)
PH UR STRIP.AUTO: 7.5 [PH]
PHOSPHATE SERPL-MCNC: 2.3 MG/DL (ref 2.5–4.9)
PLATELET # BLD AUTO: 267 X10*3/UL (ref 150–450)
POTASSIUM SERPL-SCNC: 4.2 MMOL/L (ref 3.5–5.3)
PROT UR STRIP.AUTO-MCNC: ABNORMAL MG/DL
RBC # BLD AUTO: 4.27 X10*6/UL (ref 4–5.2)
RBC # UR STRIP.AUTO: ABNORMAL /UL
RBC #/AREA URNS AUTO: NORMAL /HPF
SODIUM SERPL-SCNC: 138 MMOL/L (ref 136–145)
SP GR UR STRIP.AUTO: 1.03
UROBILINOGEN UR STRIP.AUTO-MCNC: NORMAL MG/DL
WBC # BLD AUTO: 24.4 X10*3/UL (ref 4.4–11.3)
WBC #/AREA URNS AUTO: NORMAL /HPF

## 2024-10-16 PROCEDURE — 2500000001 HC RX 250 WO HCPCS SELF ADMINISTERED DRUGS (ALT 637 FOR MEDICARE OP)

## 2024-10-16 PROCEDURE — 83735 ASSAY OF MAGNESIUM: CPT

## 2024-10-16 PROCEDURE — 1170000001 HC PRIVATE ONCOLOGY ROOM DAILY

## 2024-10-16 PROCEDURE — 2500000004 HC RX 250 GENERAL PHARMACY W/ HCPCS (ALT 636 FOR OP/ED)

## 2024-10-16 PROCEDURE — 2500000002 HC RX 250 W HCPCS SELF ADMINISTERED DRUGS (ALT 637 FOR MEDICARE OP, ALT 636 FOR OP/ED)

## 2024-10-16 PROCEDURE — 36415 COLL VENOUS BLD VENIPUNCTURE: CPT

## 2024-10-16 PROCEDURE — 2500000004 HC RX 250 GENERAL PHARMACY W/ HCPCS (ALT 636 FOR OP/ED): Mod: JZ

## 2024-10-16 PROCEDURE — 82947 ASSAY GLUCOSE BLOOD QUANT: CPT

## 2024-10-16 PROCEDURE — 2500000004 HC RX 250 GENERAL PHARMACY W/ HCPCS (ALT 636 FOR OP/ED): Performed by: STUDENT IN AN ORGANIZED HEALTH CARE EDUCATION/TRAINING PROGRAM

## 2024-10-16 PROCEDURE — 81001 URINALYSIS AUTO W/SCOPE: CPT | Performed by: STUDENT IN AN ORGANIZED HEALTH CARE EDUCATION/TRAINING PROGRAM

## 2024-10-16 PROCEDURE — RXMED WILLOW AMBULATORY MEDICATION CHARGE

## 2024-10-16 PROCEDURE — 80069 RENAL FUNCTION PANEL: CPT

## 2024-10-16 PROCEDURE — 97112 NEUROMUSCULAR REEDUCATION: CPT | Mod: GP

## 2024-10-16 PROCEDURE — 85027 COMPLETE CBC AUTOMATED: CPT

## 2024-10-16 RX ORDER — HEPARIN SODIUM 5000 [USP'U]/ML
7500 INJECTION, SOLUTION INTRAVENOUS; SUBCUTANEOUS EVERY 8 HOURS SCHEDULED
Status: DISCONTINUED | OUTPATIENT
Start: 2024-10-16 | End: 2024-10-18 | Stop reason: HOSPADM

## 2024-10-16 RX ORDER — OXYCODONE HYDROCHLORIDE 5 MG/1
5 TABLET ORAL EVERY 6 HOURS PRN
Qty: 15 TABLET | Refills: 0 | Status: SHIPPED | OUTPATIENT
Start: 2024-10-16 | End: 2024-10-25

## 2024-10-16 RX ORDER — SENNOSIDES 8.6 MG/1
1 TABLET ORAL DAILY
Qty: 7 TABLET | Refills: 0 | Status: SHIPPED | OUTPATIENT
Start: 2024-10-16 | End: 2024-10-25

## 2024-10-16 RX ORDER — ONDANSETRON 4 MG/1
4 TABLET, ORALLY DISINTEGRATING ORAL EVERY 8 HOURS PRN
Qty: 20 TABLET | Refills: 0 | Status: SHIPPED | OUTPATIENT
Start: 2024-10-16 | End: 2024-10-25

## 2024-10-16 RX ORDER — HYDROXYZINE HYDROCHLORIDE 25 MG/1
25 TABLET, FILM COATED ORAL EVERY 8 HOURS PRN
Qty: 21 TABLET | Refills: 0 | Status: SHIPPED | OUTPATIENT
Start: 2024-10-16 | End: 2024-10-25

## 2024-10-16 RX ORDER — TAMSULOSIN HYDROCHLORIDE 0.4 MG/1
0.4 CAPSULE ORAL DAILY
Status: DISCONTINUED | OUTPATIENT
Start: 2024-10-16 | End: 2024-10-18 | Stop reason: HOSPADM

## 2024-10-16 ASSESSMENT — PAIN SCALES - GENERAL
PAINLEVEL_OUTOF10: 0 - NO PAIN
PAINLEVEL_OUTOF10: 0 - NO PAIN
PAINLEVEL_OUTOF10: 4
PAINLEVEL_OUTOF10: 0 - NO PAIN

## 2024-10-16 ASSESSMENT — COGNITIVE AND FUNCTIONAL STATUS - GENERAL
TOILETING: A LITTLE
CLIMB 3 TO 5 STEPS WITH RAILING: A LOT
TURNING FROM BACK TO SIDE WHILE IN FLAT BAD: A LITTLE
STANDING UP FROM CHAIR USING ARMS: A LITTLE
WALKING IN HOSPITAL ROOM: A LITTLE
STANDING UP FROM CHAIR USING ARMS: A LITTLE
WALKING IN HOSPITAL ROOM: A LITTLE
MOBILITY SCORE: 18
HELP NEEDED FOR BATHING: A LITTLE
DRESSING REGULAR UPPER BODY CLOTHING: A LITTLE
MOVING TO AND FROM BED TO CHAIR: A LITTLE
MOVING TO AND FROM BED TO CHAIR: A LITTLE
DRESSING REGULAR LOWER BODY CLOTHING: A LITTLE
TURNING FROM BACK TO SIDE WHILE IN FLAT BAD: A LITTLE
PERSONAL GROOMING: A LITTLE

## 2024-10-16 ASSESSMENT — PAIN - FUNCTIONAL ASSESSMENT
PAIN_FUNCTIONAL_ASSESSMENT: 0-10
PAIN_FUNCTIONAL_ASSESSMENT: 0-10

## 2024-10-16 NOTE — PROGRESS NOTES
Physical Therapy    Physical Therapy Treatment    Patient Name: Sandy Cao  MRN: [id number]  Department: [facility]  Room: [facility]5013-A  Today's Date: 10/16/2024  Time Calculation  Start Time: 0932  Stop Time: 0951  Time Calculation (min): 19 min         Assessment/Plan   PT Assessment  PT Assessment Results: Decreased mobility, Impaired vision, Pain  Rehab Prognosis: Excellent  Barriers to Discharge: none  End of Session Communication: Bedside nurse  Assessment Comment: Patient reporting 1-2/10 dizziness at baseline in bed with increase with eye motion alone. When at rest, dizziness returns to baseline. Patient given habituation/desensitization exercise (eye AROM) to perform with rest when symptoms increase hourly. Patient then given compensation techniques for bed mobility and gait training (fixation of gaze + minimize head movement with mobility). Patient able to walk 70 feet with walker without loss of balance. PT hopeful to add in VOR exercises next session pending symptom recreation with eye motion next session as able and appropriate. Outpatient vestibular PT highly recommended.  End of Session Patient Position: Bed, 2 rail up, Alarm off, not on at start of session, Alarm off, caregiver present  PT Plan  Inpatient/Swing Bed or Outpatient: Inpatient  PT Plan  Treatment/Interventions: Home exercise program, Stair training, Bed mobility, Transfer training, Gait training, Balance training, Neuromuscular re-education, Neurodevelopmental intervention, Strengthening, Endurance training, Range of motion, Therapeutic exercise, Therapeutic activity  PT Plan: Ongoing PT  PT Frequency: 3 times per week  PT Discharge Recommendations: Low intensity level of continued care, Other (comment)  Equipment Recommended upon Discharge: Wheeled walker  PT Recommended Transfer Status: Contact guard  PT - OK to Discharge: Yes      General Visit Information:   PT  Visit  PT Received On: 10/16/24  Response to Previous Treatment: Patient  "with no complaints from previous session.  General  Prior to Session Communication: Bedside nurse  Patient Position Received: Bed, 2 rail up, Alarm off, not on at start of session  General Comment: Patient very receptive to PT. \"I was hoping to be a PTA before all this\"    Subjective   Precautions:  Precautions  Medical Precautions: Fall precautions    Vital Signs (Past 2hrs)        Date/Time Vitals Session Patient Position Pulse Resp SpO2 BP MAP (mmHg)    10/16/24 0836 --  --  87  18  96 %  120/80  93                         Objective   Pain:  Pain Assessment  Pain Assessment: 0-10  0-10 (Numeric) Pain Score: 0 - No pain  Cognition:  Cognition  Orientation Level: Oriented X4  Coordination:     Postural Control:       Activity Tolerance:  Activity Tolerance  Endurance: Tolerates 10 - 20 min exercise with multiple rests  Treatments:  Therapeutic Activity  Therapeutic Activity 1: Supine to sit  Therapeutic Activity 2: Sit to stand  Therapeutic Activity 3: Sit to supine    Balance/Neuromuscular Re-Education  Balance/Neuromuscular Re-Education Activity Performed: Yes  Balance/Neuromuscular Re-Education Activity 1: Habituation/desensitization (eye motion AROM) in bed  Balance/Neuromuscular Re-Education Activity 2: Fixation with bed mobility  Balance/Neuromuscular Re-Education Activity 3: Compensation: Fixation with gait/ Gait balance training    Bed Mobility 1  Bed Mobility 1: Supine to sitting  Level of Assistance 1: Close supervision    Ambulation/Gait Training  Ambulation/Gait Training Performed: Yes  Ambulation/Gait Training 1  Surface 1: Level tile  Device 1: Rolling walker  Assistance 1: Close supervision  Comments/Distance (ft) 1: 70 feet \"walking like a robot\" (Compensation). No loss of balance. Slow gait, but successful  Transfers  Transfer: Yes  Transfer 1  Transfer From 1: Sit to, Stand to  Transfer to 1: Stand, Sit  Technique 1: Sit to stand  Transfer Device 1: Walker  Transfer Level of Assistance 1: Close " supervision  Trials/Comments 1: Fixation with standing    Outcome Measures:  [facility] Basic Mobility  Turning from your back to your side while in a flat bed without using bedrails: None  Moving from lying on your back to sitting on the side of a flat bed without using bedrails: A little  Moving to and from bed to chair (including a wheelchair): A little  Standing up from a chair using your arms (e.g. wheelchair or bedside chair): A little  To walk in hospital room: A little  Climbing 3-5 steps with railing: A lot  Basic Mobility - Total Score: 18    Education Documentation  Precautions, taught by Boy Haque PT at 10/16/2024 10:10 AM.  Learner: Family, Patient  Readiness: Acceptance  Method: Demonstration, Explanation  Response: Demonstrated Understanding, Verbalizes Understanding  Comment: Vestibular rehab goals. Habituation and compensation. Patient able to summarize at end of session    Body Mechanics, taught by Boy Haque PT at 10/16/2024 10:10 AM.  Learner: Family, Patient  Readiness: Acceptance  Method: Demonstration, Explanation  Response: Demonstrated Understanding, Verbalizes Understanding  Comment: Vestibular rehab goals. Habituation and compensation. Patient able to summarize at end of session    Mobility Training, taught by Boy Haque PT at 10/16/2024 10:10 AM.  Learner: Family, Patient  Readiness: Acceptance  Method: Demonstration, Explanation  Response: Demonstrated Understanding, Verbalizes Understanding  Comment: Vestibular rehab goals. Habituation and compensation. Patient able to summarize at end of session    Education Comments  No comments found.        OP EDUCATION:       Encounter Problems       Encounter Problems (Active)       PT Problem       Bed Mobility:  Patient will perform bed mobility at independent to improve functional mobility and maximize patient safety.    (Progressing)       Start:  10/15/24    Expected End:  10/29/24            Transfers: Patient will  perform functional transfers at independent to improve functional mobility and maximize patient safety.    (Progressing)       Start:  10/15/24    Expected End:  10/29/24            Tinnetti: pt will score > 20 to reduce fall risk and promote safety and functional mobility    (Progressing)       Start:  10/15/24    Expected End:  10/29/24            Gait: Pt will ambulate 100ft with no device at independent with no LOB.   (Progressing)       Start:  10/15/24    Expected End:  10/29/24               Pain - Adult

## 2024-10-16 NOTE — PROGRESS NOTES
"Sandy Cao is a 28 y.o. female on day 2 of admission presenting with Acoustic neuroma (Multi).     Subjective   No major acute events overnight. Reports anxiety and diplopia. Retaining urine, catheter replaced.        Objective     ENT Physical Exam  Constitutional  Appearance: patient appears well-developed and well-nourished,  Communication/Voice: communication appropriate for developmental age;  Head and Face  Appearance: head appears normal and face appears normal;  Ear  Hearing: impaired to conversational voice;  Neurovestibular  Psychiatric: mood anxious;  Mastoid dressing in place, covering the right ear, HB 1/6  AC>BC on the left, unable to assess the right 2/2 mastoid dressing in place- taken down and incision is cdi  Breathing well on RA    Last Recorded Vitals  Blood pressure 130/85, pulse 82, temperature 36.6 °C (97.9 °F), temperature source Temporal, resp. rate 18, height 1.6 m (5' 3\"), weight 109 kg (239 lb 13.8 oz), SpO2 95%.  Intake/Output last 3 Shifts:  I/O last 3 completed shifts:  In: 630 (5.8 mL/kg) [P.O.:180; I.V.:50 (0.5 mL/kg); IV Piggyback:400]  Out: 2375 (21.8 mL/kg) [Urine:2375 (0.6 mL/kg/hr)]  Weight: 108.8 kg       Assessment/Plan   Assessment & Plan  Acoustic neuroma (Multi)    Sensorineural hearing loss (SNHL) of right ear with unrestricted hearing of left ear    29y/o female with a history of a right vestibular schwannoma, now OR s/p right translabyrinthine approach to removal, abdominal fat graft  and lumbar drain placement, followed by removal with Sheri Conrad and Eddie.    -Analgesia:  Tylenol, Oxycodone and Dilaudid prn  -FEN: Regular diet, HLIV,  monitor and replete electrolytes as needed  -Pulm: Wean oxygen to room air, incentive spirometer 10x/hr while awake  -ID: Ancef x 48 hours completed  -Cardiac: No active issues  -Heme: leukocytosis to 19.7, will continue to monitor, Monitor H/H, daily CBC- suspect steroid related but given retention and dysuria UA sent  -GI: " Bowel regimen, PPI,  and PRN anti-emetic  -: Aguirre dc'd 10/15, voiding spontaneously  -Steroids/Special: Decadron 8q8 to continue for facial nerve weakness- stop date TBD, will continue to monitor  -Embolic PPx: SQH, SCDs while in bed  -Dispo: [facility] 5 RNF

## 2024-10-16 NOTE — PROGRESS NOTES
"Sandy Cao is a 28 y.o. female on day 2 of admission presenting with Acoustic neuroma (Multi).    Subjective   NAEO. Doing well, moved to [facility] in care of ENT        Objective     Physical Exam  Awake  Ox3  Ou3R  FS  TM  BUE 5  BLE 5  R sided HL   Incision covered with head wrap     Last Recorded Vitals  Blood pressure 120/80, pulse 87, temperature 36.4 °C (97.5 °F), temperature source Temporal, resp. rate 18, height 1.6 m (5' 3\"), weight 109 kg (239 lb 13.8 oz), SpO2 96%.  Intake/Output last 3 Shifts:  I/O last 3 completed shifts:  In: 630 (5.8 mL/kg) [P.O.:180; I.V.:50 (0.5 mL/kg); IV Piggyback:400]  Out: 2375 (21.8 mL/kg) [Urine:2375 (0.6 mL/kg/hr)]  Weight: 108.8 kg     Relevant Results                 Results for orders placed or performed during the hospital encounter of 10/14/24 (from the past 24 hours)   POCT GLUCOSE   Result Value Ref Range    POCT Glucose 156 (H) 74 - 99 mg/dL   POCT GLUCOSE   Result Value Ref Range    POCT Glucose 133 (H) 74 - 99 mg/dL   POCT GLUCOSE   Result Value Ref Range    POCT Glucose 126 (H) 74 - 99 mg/dL   CBC   Result Value Ref Range    WBC 24.4 (H) 4.4 - 11.3 x10*3/uL    nRBC 0.0 0.0 - 0.0 /100 WBCs    RBC 4.27 4.00 - 5.20 x10*6/uL    Hemoglobin 12.4 12.0 - 16.0 g/dL    Hematocrit 37.4 36.0 - 46.0 %    MCV 88 80 - 100 fL    MCH 29.0 26.0 - 34.0 pg    MCHC 33.2 32.0 - 36.0 g/dL    RDW 12.6 11.5 - 14.5 %    Platelets 267 150 - 450 x10*3/uL   Magnesium   Result Value Ref Range    Magnesium 2.46 (H) 1.60 - 2.40 mg/dL   Renal Function Panel   Result Value Ref Range    Glucose 127 (H) 74 - 99 mg/dL    Sodium 138 136 - 145 mmol/L    Potassium 4.2 3.5 - 5.3 mmol/L    Chloride 105 98 - 107 mmol/L    Bicarbonate 24 21 - 32 mmol/L    Anion Gap 13 10 - 20 mmol/L    Urea Nitrogen 15 6 - 23 mg/dL    Creatinine 0.77 0.50 - 1.05 mg/dL    eGFR >90 >60 mL/min/1.73m*2    Calcium 9.2 8.6 - 10.6 mg/dL    Phosphorus 2.3 (L) 2.5 - 4.9 mg/dL    Albumin 4.2 3.4 - 5.0 g/dL   POCT GLUCOSE   Result " Value Ref Range    POCT Glucose 145 (H) 74 - 99 mg/dL                  Assessment/Plan   Assessment & Plan  Acoustic neuroma (Multi)    Sensorineural hearing loss (SNHL) of right ear with unrestricted hearing of left ear    28F with h/o asthma, depression who presents with R tinnitus 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

## 2024-10-17 LAB
ALBUMIN SERPL BCP-MCNC: 4.5 G/DL (ref 3.4–5)
ANION GAP SERPL CALC-SCNC: 12 MMOL/L (ref 10–20)
BUN SERPL-MCNC: 21 MG/DL (ref 6–23)
CALCIUM SERPL-MCNC: 9.5 MG/DL (ref 8.6–10.6)
CHLORIDE SERPL-SCNC: 103 MMOL/L (ref 98–107)
CO2 SERPL-SCNC: 26 MMOL/L (ref 21–32)
CREAT SERPL-MCNC: 0.78 MG/DL (ref 0.5–1.05)
EGFRCR SERPLBLD CKD-EPI 2021: >90 ML/MIN/1.73M*2
ERYTHROCYTE [DISTWIDTH] IN BLOOD BY AUTOMATED COUNT: 12.3 % (ref 11.5–14.5)
GLUCOSE BLD MANUAL STRIP-MCNC: 127 MG/DL (ref 74–99)
GLUCOSE BLD MANUAL STRIP-MCNC: 128 MG/DL (ref 74–99)
GLUCOSE BLD MANUAL STRIP-MCNC: 137 MG/DL (ref 74–99)
GLUCOSE BLD MANUAL STRIP-MCNC: 153 MG/DL (ref 74–99)
GLUCOSE SERPL-MCNC: 126 MG/DL (ref 74–99)
HCT VFR BLD AUTO: 39.3 % (ref 36–46)
HGB BLD-MCNC: 13.1 G/DL (ref 12–16)
HOLD SPECIMEN: NORMAL
LABORATORY COMMENT REPORT: NORMAL
MAGNESIUM SERPL-MCNC: 2.47 MG/DL (ref 1.6–2.4)
MCH RBC QN AUTO: 29.1 PG (ref 26–34)
MCHC RBC AUTO-ENTMCNC: 33.3 G/DL (ref 32–36)
MCV RBC AUTO: 87 FL (ref 80–100)
NRBC BLD-RTO: 0 /100 WBCS (ref 0–0)
PATH REPORT.FINAL DX SPEC: NORMAL
PATH REPORT.GROSS SPEC: NORMAL
PATH REPORT.RELEVANT HX SPEC: NORMAL
PATH REPORT.TOTAL CANCER: NORMAL
PHOSPHATE SERPL-MCNC: 3.1 MG/DL (ref 2.5–4.9)
PLATELET # BLD AUTO: 279 X10*3/UL (ref 150–450)
POTASSIUM SERPL-SCNC: 4.4 MMOL/L (ref 3.5–5.3)
RBC # BLD AUTO: 4.5 X10*6/UL (ref 4–5.2)
SODIUM SERPL-SCNC: 137 MMOL/L (ref 136–145)
WBC # BLD AUTO: 19.7 X10*3/UL (ref 4.4–11.3)

## 2024-10-17 PROCEDURE — 85027 COMPLETE CBC AUTOMATED: CPT

## 2024-10-17 PROCEDURE — 2500000001 HC RX 250 WO HCPCS SELF ADMINISTERED DRUGS (ALT 637 FOR MEDICARE OP)

## 2024-10-17 PROCEDURE — 2500000002 HC RX 250 W HCPCS SELF ADMINISTERED DRUGS (ALT 637 FOR MEDICARE OP, ALT 636 FOR OP/ED)

## 2024-10-17 PROCEDURE — 2500000005 HC RX 250 GENERAL PHARMACY W/O HCPCS: Performed by: STUDENT IN AN ORGANIZED HEALTH CARE EDUCATION/TRAINING PROGRAM

## 2024-10-17 PROCEDURE — 2500000004 HC RX 250 GENERAL PHARMACY W/ HCPCS (ALT 636 FOR OP/ED)

## 2024-10-17 PROCEDURE — 80069 RENAL FUNCTION PANEL: CPT

## 2024-10-17 PROCEDURE — 82947 ASSAY GLUCOSE BLOOD QUANT: CPT

## 2024-10-17 PROCEDURE — 2500000004 HC RX 250 GENERAL PHARMACY W/ HCPCS (ALT 636 FOR OP/ED): Performed by: STUDENT IN AN ORGANIZED HEALTH CARE EDUCATION/TRAINING PROGRAM

## 2024-10-17 PROCEDURE — 83735 ASSAY OF MAGNESIUM: CPT

## 2024-10-17 PROCEDURE — 97112 NEUROMUSCULAR REEDUCATION: CPT | Mod: GP,CQ

## 2024-10-17 PROCEDURE — 1170000001 HC PRIVATE ONCOLOGY ROOM DAILY

## 2024-10-17 PROCEDURE — 97530 THERAPEUTIC ACTIVITIES: CPT | Mod: GP,CQ

## 2024-10-17 PROCEDURE — 36415 COLL VENOUS BLD VENIPUNCTURE: CPT

## 2024-10-17 ASSESSMENT — PAIN DESCRIPTION - ORIENTATION
ORIENTATION: RIGHT
ORIENTATION: RIGHT

## 2024-10-17 ASSESSMENT — COGNITIVE AND FUNCTIONAL STATUS - GENERAL
TURNING FROM BACK TO SIDE WHILE IN FLAT BAD: A LITTLE
TURNING FROM BACK TO SIDE WHILE IN FLAT BAD: A LITTLE
CLIMB 3 TO 5 STEPS WITH RAILING: A LOT
HELP NEEDED FOR BATHING: A LITTLE
TOILETING: A LITTLE
WALKING IN HOSPITAL ROOM: A LITTLE
HELP NEEDED FOR BATHING: A LITTLE
DRESSING REGULAR LOWER BODY CLOTHING: A LITTLE
PERSONAL GROOMING: A LITTLE
CLIMB 3 TO 5 STEPS WITH RAILING: A LITTLE
TURNING FROM BACK TO SIDE WHILE IN FLAT BAD: A LITTLE
DAILY ACTIVITIY SCORE: 19
WALKING IN HOSPITAL ROOM: A LITTLE
PERSONAL GROOMING: A LITTLE
MOBILITY SCORE: 19
STANDING UP FROM CHAIR USING ARMS: A LITTLE
MOVING TO AND FROM BED TO CHAIR: A LITTLE
CLIMB 3 TO 5 STEPS WITH RAILING: A LITTLE
STANDING UP FROM CHAIR USING ARMS: A LITTLE
DRESSING REGULAR UPPER BODY CLOTHING: A LITTLE
WALKING IN HOSPITAL ROOM: A LITTLE
DRESSING REGULAR UPPER BODY CLOTHING: A LITTLE
MOBILITY SCORE: 19
MOVING TO AND FROM BED TO CHAIR: A LITTLE
CLIMB 3 TO 5 STEPS WITH RAILING: A LITTLE
PERSONAL GROOMING: A LITTLE
TOILETING: A LITTLE
DRESSING REGULAR LOWER BODY CLOTHING: A LITTLE
MOVING TO AND FROM BED TO CHAIR: A LITTLE
MOVING FROM LYING ON BACK TO SITTING ON SIDE OF FLAT BED WITH BEDRAILS: A LITTLE
DRESSING REGULAR LOWER BODY CLOTHING: A LITTLE
DAILY ACTIVITIY SCORE: 19
STANDING UP FROM CHAIR USING ARMS: A LITTLE
MOBILITY SCORE: 17
MOVING TO AND FROM BED TO CHAIR: A LITTLE
DAILY ACTIVITIY SCORE: 19
TOILETING: A LITTLE
HELP NEEDED FOR BATHING: A LITTLE
TURNING FROM BACK TO SIDE WHILE IN FLAT BAD: A LITTLE
STANDING UP FROM CHAIR USING ARMS: A LITTLE
WALKING IN HOSPITAL ROOM: A LITTLE
DRESSING REGULAR UPPER BODY CLOTHING: A LITTLE
MOBILITY SCORE: 19

## 2024-10-17 ASSESSMENT — PAIN SCALES - GENERAL
PAINLEVEL_OUTOF10: 1
PAINLEVEL_OUTOF10: 7
PAINLEVEL_OUTOF10: 5 - MODERATE PAIN
PAINLEVEL_OUTOF10: 0 - NO PAIN
PAINLEVEL_OUTOF10: 4
PAINLEVEL_OUTOF10: 3
PAINLEVEL_OUTOF10: 4
PAINLEVEL_OUTOF10: 5 - MODERATE PAIN
PAINLEVEL_OUTOF10: 0 - NO PAIN

## 2024-10-17 ASSESSMENT — PAIN DESCRIPTION - LOCATION
LOCATION: ABDOMEN
LOCATION: HEAD

## 2024-10-17 ASSESSMENT — PAIN - FUNCTIONAL ASSESSMENT
PAIN_FUNCTIONAL_ASSESSMENT: 0-10

## 2024-10-17 ASSESSMENT — PAIN SCALES - PAIN ASSESSMENT IN ADVANCED DEMENTIA (PAINAD): TOTALSCORE: MEDICATION (SEE MAR)

## 2024-10-17 NOTE — PROGRESS NOTES
"Sandy Cao is a 28 y.o. female on day 3 of admission presenting with Acoustic neuroma (Multi).    Subjective   NAEO. Doing well, walking around, pain moderately well controlled, had mahoney placed again       Objective     Physical Exam  Awake  Ox3  Ou3R  FS  TM  BUE 5  BLE 5  R sided HL   Incision covered with head wrap     Last Recorded Vitals  Blood pressure 134/75, pulse 65, temperature 35.8 °C (96.4 °F), temperature source Temporal, resp. rate 16, height 1.6 m (5' 3\"), weight 109 kg (239 lb 13.8 oz), SpO2 97%.  Intake/Output last 3 Shifts:  I/O last 3 completed shifts:  In: 696.5 (6.4 mL/kg) [P.O.:240; I.V.:256.5 (2.4 mL/kg); IV Piggyback:200]  Out: 2750 (25.3 mL/kg) [Urine:2750 (0.7 mL/kg/hr)]  Weight: 108.8 kg     Relevant Results                 Results for orders placed or performed during the hospital encounter of 10/14/24 (from the past 24 hours)   POCT GLUCOSE   Result Value Ref Range    POCT Glucose 161 (H) 74 - 99 mg/dL   POCT GLUCOSE   Result Value Ref Range    POCT Glucose 139 (H) 74 - 99 mg/dL   Urinalysis with Reflex Culture and Microscopic   Result Value Ref Range    Color, Urine Light-Yellow Light-Yellow, Yellow, Dark-Yellow    Appearance, Urine Clear Clear    Specific Gravity, Urine 1.029 1.005 - 1.035    pH, Urine 7.5 5.0, 5.5, 6.0, 6.5, 7.0, 7.5, 8.0    Protein, Urine 20 (TRACE) NEGATIVE, 10 (TRACE), 20 (TRACE) mg/dL    Glucose, Urine Normal Normal mg/dL    Blood, Urine 0.2 (2+) (A) NEGATIVE    Ketones, Urine NEGATIVE NEGATIVE mg/dL    Bilirubin, Urine NEGATIVE NEGATIVE    Urobilinogen, Urine Normal Normal mg/dL    Nitrite, Urine NEGATIVE NEGATIVE    Leukocyte Esterase, Urine NEGATIVE NEGATIVE   Extra Urine Gray Tube   Result Value Ref Range    Extra Tube Hold for add-ons.    Urinalysis Microscopic   Result Value Ref Range    WBC, Urine NONE 1-5, NONE /HPF    RBC, Urine 1-2 NONE, 1-2, 3-5 /HPF    Mucus, Urine FEW Reference range not established. /LPF   POCT GLUCOSE   Result Value Ref Range "    POCT Glucose 127 (H) 74 - 99 mg/dL   CBC   Result Value Ref Range    WBC 19.7 (H) 4.4 - 11.3 x10*3/uL    nRBC 0.0 0.0 - 0.0 /100 WBCs    RBC 4.50 4.00 - 5.20 x10*6/uL    Hemoglobin 13.1 12.0 - 16.0 g/dL    Hematocrit 39.3 36.0 - 46.0 %    MCV 87 80 - 100 fL    MCH 29.1 26.0 - 34.0 pg    MCHC 33.3 32.0 - 36.0 g/dL    RDW 12.3 11.5 - 14.5 %    Platelets 279 150 - 450 x10*3/uL   Magnesium   Result Value Ref Range    Magnesium 2.47 (H) 1.60 - 2.40 mg/dL   Renal Function Panel   Result Value Ref Range    Glucose 126 (H) 74 - 99 mg/dL    Sodium 137 136 - 145 mmol/L    Potassium 4.4 3.5 - 5.3 mmol/L    Chloride 103 98 - 107 mmol/L    Bicarbonate 26 21 - 32 mmol/L    Anion Gap 12 10 - 20 mmol/L    Urea Nitrogen 21 6 - 23 mg/dL    Creatinine 0.78 0.50 - 1.05 mg/dL    eGFR >90 >60 mL/min/1.73m*2    Calcium 9.5 8.6 - 10.6 mg/dL    Phosphorus 3.1 2.5 - 4.9 mg/dL    Albumin 4.5 3.4 - 5.0 g/dL                  Assessment/Plan   Assessment & Plan  Acoustic neuroma (Multi)    Sensorineural hearing loss (SNHL) of right ear with unrestricted hearing of left ear    28F with h/o asthma, depression who presents with R tinnitus 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

## 2024-10-17 NOTE — CARE PLAN
The clinical goals for the shift include pt to be safe from falls and injury by the end of the shift    Over the shift, the patient did make progress toward the following goals.

## 2024-10-17 NOTE — PROGRESS NOTES
Physical Therapy Treatment    Patient Name: Sandy Cao  MRN: [id number]  Today's Date: 10/17/2024  Room: [address] [place], [place] [address]  Time Calculation  Start Time: 1025  Stop Time: 1105  Time Calculation (min): 40 min       Assessment/Plan   PT Assessment  PT Assessment Results: Decreased mobility, Impaired vision, Impaired balance  Rehab Prognosis: Excellent  Barriers to Discharge: none  Evaluation/Treatment Tolerance: Patient tolerated treatment well  Medical Staff Made Aware: Yes  Strengths: Attitude of self, Support of Caregivers  Barriers to Participation: Comorbidities  End of Session Communication: Bedside nurse  End of Session Patient Position: Up in chair, Alarm off, caregiver present  PT Plan  Inpatient/Swing Bed or Outpatient: Inpatient  PT Plan  Treatment/Interventions: Home exercise program, Stair training, Bed mobility, Transfer training, Gait training, Balance training, Neuromuscular re-education, Neurodevelopmental intervention, Strengthening, Endurance training, Range of motion, Therapeutic exercise, Therapeutic activity  PT Plan: Ongoing PT  PT Frequency: 3 times per week  PT Discharge Recommendations: Low intensity level of continued care, Other (comment)  Equipment Recommended upon Discharge: Wheeled walker  PT Recommended Transfer Status: Contact guard  PT - OK to Discharge: Yes  Assessment: Patient is progressing Well with therapy this date. Focus on VORx1 exercises, given HEP. Would continue to benefit from continued skilled PT to address all mobility deficits; Patient remains appropriate for LOW intensity therapy (OP vestibular rehab) when medically appropriate for discharge from acute stay.  Will continue to follow.     General Visit Information:   PT  Visit  PT Received On: 10/17/24  Prior to Session Communication: Bedside nurse  Patient Position Received: Bed, 2 rail up, Alarm off, not on at start of session  Family/Caregiver Present: Yes  Caregiver Feedback: Fiance present and supportive throughout  session     Subjective   Subjective: Pt pleasant and motivated.  Precautions:  Precautions  Medical Precautions: Fall precautions  Precautions Comment: SBP <160  Vital Signs:  Vital Signs (Past 2hrs)        Date/Time Vitals Session Patient Position Pulse Resp SpO2 BP MAP (mmHg)    10/17/24 1139 --  --  76  16  98 %  137/89  --              Objective   Pain:  Pain Assessment  Pain Assessment: 0-10  0-10 (Numeric) Pain Score: 0 - No pain  Cognition:  Cognition  Overall Cognitive Status: Within Functional Limits  Orientation Level: Oriented X4  Insight: Within function limits  Impulsive: Within functional limits    PT Treatments:    Balance/Neuromuscular Re-Education  Balance/Neuromuscular Re-Education Activity 1: Focus on VOR exercises seated EOB. Pt able to tolerate VORx1 horizontal/vertical neck movements with eyes on stationary visual target. Able to track moving  target with eyes, head stable. Pt requires a break after 1-2 reps. Verbal/tactile cues to encourage head movements vs. trunk rotation. Pt reports 5-6/10 dizzinness during exercises. Pt not able to tolerate VORx2 exercises at this time. Pt given above exercises on HEP handout. Educated to complete as tolerated.  Bed Mobility 1  Bed Mobility 1: Supine to sitting  Level of Assistance 1: Close supervision, Minimal verbal cues  Bed Mobility Comments 1: vc for proper sequencing  Ambulation/Gait Training 1  Surface 1: Level tile  Device 1: Rolling walker  Assistance 1: Close supervision  Quality of Gait 1: Decreased step length, Inconsistent stride length (unsteady)  Comments/Distance (ft) 1: 15'x2  Transfer 1  Transfer From 1: Sit to  Transfer to 1: Stand  Transfer Device 1: Walker  Transfer Level of Assistance 1: Close supervision, Minimal verbal cues  Trials/Comments 1: x3 vc for handplacement  Transfers 2  Transfer From 2: Stand to  Transfer to 2: Chair with arms, Toilet  Technique 2: Stand pivot  Transfer Device 2: Walker  Transfer Level of Assistance 2:  Close supervision  Trials/Comments 2: vc for sequencing and AD mgmt       Activity tolerance:  Activity Tolerance  Endurance: Tolerates 30 min exercise with multiple rests    Outcome Measures:  [facility] Basic Mobility  Turning from your back to your side while in a flat bed without using bedrails: A little  Moving from lying on your back to sitting on the side of a flat bed without using bedrails: A little  Moving to and from bed to chair (including a wheelchair): A little  Standing up from a chair using your arms (e.g. wheelchair or bedside chair): A little  To walk in hospital room: A little  Climbing 3-5 steps with railing: A lot  Basic Mobility - Total Score: 17       Education Documentation  Body Mechanics, taught by Katia Siu PTA at 10/17/2024 12:42 PM.  Learner: Significant Other, Patient  Readiness: Acceptance  Method: Explanation, Handout  Response: Verbalizes Understanding  Comment: HEP vestibular    Precautions, taught by Katia Siu PTA at 10/17/2024 12:42 PM.  Learner: Significant Other, Patient  Readiness: Acceptance  Method: Explanation, Handout  Response: Verbalizes Understanding  Comment: HEP vestibular    ADL Training, taught by Katia Siu PTA at 10/17/2024 12:42 PM.  Learner: Significant Other, Patient  Readiness: Acceptance  Method: Explanation, Handout  Response: Verbalizes Understanding  Comment: HEP vestibular    Precautions, taught by Katia Siu PTA at 10/17/2024 12:42 PM.  Learner: Significant Other, Patient  Readiness: Acceptance  Method: Explanation, Handout  Response: Verbalizes Understanding  Comment: HEP vestibular    Body Mechanics, taught by Katia Siu PTA at 10/17/2024 12:42 PM.  Learner: Significant Other, Patient  Readiness: Acceptance  Method: Explanation, Handout  Response: Verbalizes Understanding  Comment: HEP vestibular    Mobility Training, taught by Katia Siu PTA at 10/17/2024 12:42 PM.  Learner: Significant Other, Patient  Readiness: Acceptance  Method:  Explanation, Handout  Response: Verbalizes Understanding  Comment: HEP vestibular    Education Comments  No comments found.          OP EDUCATION:       Encounter Problems       Encounter Problems (Active)       PT Problem       Bed Mobility:  Patient will perform bed mobility at independent to improve functional mobility and maximize patient safety.    (Progressing)       Start:  10/15/24    Expected End:  10/29/24            Transfers: Patient will perform functional transfers at independent to improve functional mobility and maximize patient safety.    (Progressing)       Start:  10/15/24    Expected End:  10/29/24            Tinnetti: pt will score > 20 to reduce fall risk and promote safety and functional mobility    (Progressing)       Start:  10/15/24    Expected End:  10/29/24            Gait: Pt will ambulate 100ft with no device at independent with no LOB.   (Progressing)       Start:  10/15/24    Expected End:  10/29/24               Pain - Adult

## 2024-10-17 NOTE — PROGRESS NOTES
"Sandy Cao is a 28 y.o. female on day 3 of admission presenting with Acoustic neuroma (Multi).     Subjective   No major acute events overnight.      Objective     ENT Physical Exam  Constitutional  Appearance: patient appears well-developed and well-nourished,  Communication/Voice: communication appropriate for developmental age;  Head and Face  Appearance: head appears normal and face appears normal;  Ear  Hearing: impaired to conversational voice;  Neurovestibular  Psychiatric: mood anxious;  HB 2/6  AC>BC on the left, taken down and incision is cdi  Breathing well on RA  No nystagmus    Last Recorded Vitals  Blood pressure (!) 132/94, pulse 71, temperature 36.3 °C (97.3 °F), temperature source Temporal, resp. rate 16, height 1.6 m (5' 3\"), weight 109 kg (239 lb 13.8 oz), SpO2 97%.  Intake/Output last 3 Shifts:  I/O last 3 completed shifts:  In: 696.5 (6.4 mL/kg) [P.O.:240; I.V.:256.5 (2.4 mL/kg); IV Piggyback:200]  Out: 2750 (25.3 mL/kg) [Urine:2750 (0.7 mL/kg/hr)]  Weight: 108.8 kg       Assessment/Plan   Assessment & Plan  Acoustic neuroma (Multi)    Sensorineural hearing loss (SNHL) of right ear with unrestricted hearing of left ear    29y/o female with a history of a right vestibular schwannoma, now OR s/p right translabyrinthine approach to removal, abdominal fat graft  and lumbar drain placement, followed by removal with Sheri Conrad and Eddie.    -Analgesia:  Tylenol, Oxycodone and Dilaudid prn  -FEN: Regular diet, HLIV,  monitor and replete electrolytes as needed  -Pulm: Wean oxygen to room air, incentive spirometer 10x/hr while awake  -ID: Ancef x 48 hours completed, UA negative- no indication for abx  -Cardiac: No active issues  -Heme: leukocytosis, will continue to monitor, Monitor H/H, daily CBC- suspect steroid related but given retention and dysuria UA sent  -GI: Bowel regimen, PPI,  and PRN anti-emetic  -: Mahoney replaced due to retention, remove mahoney 10/17 and follow up trial of " void  -Steroids/Special: Decadron 8q8 to continue for facial nerve weakness- improving, will dc steroids  -Embolic PPx: SQH, SCDs while in bed  -Dispo: [facility] RNF, PT/OT rec outpatient

## 2024-10-17 NOTE — CARE PLAN
Problem: Pain - Adult  Goal: Verbalizes/displays adequate comfort level or baseline comfort level  Outcome: Progressing     Problem: Safety - Adult  Goal: Free from fall injury  Outcome: Progressing     Problem: Chronic Conditions and Co-morbidities  Goal: Patient's chronic conditions and co-morbidity symptoms are monitored and maintained or improved  Outcome: Progressing     Problem: Skin  Goal: Participates in plan/prevention/treatment measures  Outcome: Progressing  Goal: Promote/optimize nutrition  Outcome: Progressing     Problem: Pain  Goal: Takes deep breaths with improved pain control throughout the shift  Outcome: Progressing  Goal: Turns in bed with improved pain control throughout the shift  Outcome: Progressing  Goal: Free from opioid side effects throughout the shift  Outcome: Progressing  Goal: Free from acute confusion related to pain meds throughout the shift  Outcome: Progressing     Problem: Fall/Injury  Goal: Not fall by end of shift  Outcome: Progressing  Goal: Be free from injury by end of the shift  Outcome: Progressing  Goal: Verbalize understanding of personal risk factors for fall in the hospital  Outcome: Progressing  Goal: Verbalize understanding of risk factor reduction measures to prevent injury from fall in the home  Outcome: Progressing  Goal: Use assistive devices by end of the shift  Outcome: Progressing  Goal: Pace activities to prevent fatigue by end of the shift  Outcome: Progressing   The patient's goals for the shift include      The clinical goals for the shift include pt will have improve GI function

## 2024-10-18 ENCOUNTER — PHARMACY VISIT (OUTPATIENT)
Dept: PHARMACY | Facility: CLINIC | Age: 29
End: 2024-10-18
Payer: MEDICAID

## 2024-10-18 VITALS
HEART RATE: 66 BPM | DIASTOLIC BLOOD PRESSURE: 86 MMHG | SYSTOLIC BLOOD PRESSURE: 138 MMHG | TEMPERATURE: 97.2 F | OXYGEN SATURATION: 98 % | WEIGHT: 239.86 LBS | HEIGHT: 63 IN | RESPIRATION RATE: 16 BRPM | BODY MASS INDEX: 42.5 KG/M2

## 2024-10-18 LAB
ALBUMIN SERPL BCP-MCNC: 4.1 G/DL (ref 3.4–5)
ANION GAP SERPL CALC-SCNC: 11 MMOL/L (ref 10–20)
BUN SERPL-MCNC: 22 MG/DL (ref 6–23)
CALCIUM SERPL-MCNC: 9.5 MG/DL (ref 8.6–10.6)
CHLORIDE SERPL-SCNC: 102 MMOL/L (ref 98–107)
CO2 SERPL-SCNC: 29 MMOL/L (ref 21–32)
CREAT SERPL-MCNC: 0.82 MG/DL (ref 0.5–1.05)
EGFRCR SERPLBLD CKD-EPI 2021: >90 ML/MIN/1.73M*2
ERYTHROCYTE [DISTWIDTH] IN BLOOD BY AUTOMATED COUNT: 12.2 % (ref 11.5–14.5)
GLUCOSE BLD MANUAL STRIP-MCNC: 95 MG/DL (ref 74–99)
GLUCOSE SERPL-MCNC: 75 MG/DL (ref 74–99)
HCT VFR BLD AUTO: 39.3 % (ref 36–46)
HGB BLD-MCNC: 13.1 G/DL (ref 12–16)
MAGNESIUM SERPL-MCNC: 2.33 MG/DL (ref 1.6–2.4)
MCH RBC QN AUTO: 28.8 PG (ref 26–34)
MCHC RBC AUTO-ENTMCNC: 33.3 G/DL (ref 32–36)
MCV RBC AUTO: 86 FL (ref 80–100)
NRBC BLD-RTO: 0.2 /100 WBCS (ref 0–0)
PHOSPHATE SERPL-MCNC: 3.3 MG/DL (ref 2.5–4.9)
PLATELET # BLD AUTO: 256 X10*3/UL (ref 150–450)
POTASSIUM SERPL-SCNC: 4.1 MMOL/L (ref 3.5–5.3)
RBC # BLD AUTO: 4.55 X10*6/UL (ref 4–5.2)
SODIUM SERPL-SCNC: 138 MMOL/L (ref 136–145)
WBC # BLD AUTO: 17.3 X10*3/UL (ref 4.4–11.3)

## 2024-10-18 PROCEDURE — 2500000005 HC RX 250 GENERAL PHARMACY W/O HCPCS: Performed by: STUDENT IN AN ORGANIZED HEALTH CARE EDUCATION/TRAINING PROGRAM

## 2024-10-18 PROCEDURE — 2500000004 HC RX 250 GENERAL PHARMACY W/ HCPCS (ALT 636 FOR OP/ED): Performed by: STUDENT IN AN ORGANIZED HEALTH CARE EDUCATION/TRAINING PROGRAM

## 2024-10-18 PROCEDURE — 83735 ASSAY OF MAGNESIUM: CPT

## 2024-10-18 PROCEDURE — 80069 RENAL FUNCTION PANEL: CPT

## 2024-10-18 PROCEDURE — 2500000002 HC RX 250 W HCPCS SELF ADMINISTERED DRUGS (ALT 637 FOR MEDICARE OP, ALT 636 FOR OP/ED)

## 2024-10-18 PROCEDURE — 2500000004 HC RX 250 GENERAL PHARMACY W/ HCPCS (ALT 636 FOR OP/ED)

## 2024-10-18 PROCEDURE — 36415 COLL VENOUS BLD VENIPUNCTURE: CPT

## 2024-10-18 PROCEDURE — 2500000001 HC RX 250 WO HCPCS SELF ADMINISTERED DRUGS (ALT 637 FOR MEDICARE OP)

## 2024-10-18 PROCEDURE — 82947 ASSAY GLUCOSE BLOOD QUANT: CPT

## 2024-10-18 PROCEDURE — 85027 COMPLETE CBC AUTOMATED: CPT

## 2024-10-18 ASSESSMENT — COLUMBIA-SUICIDE SEVERITY RATING SCALE - C-SSRS
1. IN THE PAST MONTH, HAVE YOU WISHED YOU WERE DEAD OR WISHED YOU COULD GO TO SLEEP AND NOT WAKE UP?: NO
2. HAVE YOU ACTUALLY HAD ANY THOUGHTS OF KILLING YOURSELF?: NO
6. HAVE YOU EVER DONE ANYTHING, STARTED TO DO ANYTHING, OR PREPARED TO DO ANYTHING TO END YOUR LIFE?: NO

## 2024-10-18 ASSESSMENT — PAIN SCALES - GENERAL: PAINLEVEL_OUTOF10: 7

## 2024-10-18 NOTE — CARE PLAN
The patient's goals for the shift include      The clinical goals for the shift include pain control      Problem: Pain - Adult  Goal: Verbalizes/displays adequate comfort level or baseline comfort level  Outcome: Progressing     Problem: Safety - Adult  Goal: Free from fall injury  Outcome: Progressing     Problem: Discharge Planning  Goal: Discharge to home or other facility with appropriate resources  Outcome: Progressing     Problem: Chronic Conditions and Co-morbidities  Goal: Patient's chronic conditions and co-morbidity symptoms are monitored and maintained or improved  Outcome: Progressing     Problem: Skin  Goal: Decreased wound size/increased tissue granulation at next dressing change  Outcome: Progressing  Goal: Participates in plan/prevention/treatment measures  Outcome: Progressing  Goal: Prevent/manage excess moisture  Outcome: Progressing  Goal: Prevent/minimize sheer/friction injuries  Outcome: Progressing  Goal: Promote/optimize nutrition  Outcome: Progressing  Goal: Promote skin healing  Outcome: Progressing     Problem: Pain  Goal: Takes deep breaths with improved pain control throughout the shift  Outcome: Progressing  Goal: Turns in bed with improved pain control throughout the shift  Outcome: Progressing  Goal: Walks with improved pain control throughout the shift  Outcome: Progressing  Goal: Performs ADL's with improved pain control throughout shift  Outcome: Progressing  Goal: Participates in PT with improved pain control throughout the shift  Outcome: Progressing  Goal: Free from opioid side effects throughout the shift  Outcome: Progressing  Goal: Free from acute confusion related to pain meds throughout the shift  Outcome: Progressing     Problem: Fall/Injury  Goal: Not fall by end of shift  Outcome: Progressing  Goal: Be free from injury by end of the shift  Outcome: Progressing  Goal: Verbalize understanding of personal risk factors for fall in the hospital  Outcome: Progressing  Goal:  Verbalize understanding of risk factor reduction measures to prevent injury from fall in the home  Outcome: Progressing  Goal: Use assistive devices by end of the shift  Outcome: Progressing  Goal: Pace activities to prevent fatigue by end of the shift  Outcome: Progressing

## 2024-10-18 NOTE — DISCHARGE INSTRUCTIONS
Call to arrange/verify outpatient vestibular rehab treatments.   Ophthomology team will arrange outpatient follow up visit  You currently have follow up scheduled with Dr. Conrad on 11/1.   Most ear surgeries should have a 2-4 week postoperative appointment. Please be sure to call the doctor's office and make a follow-up appointment, if you don't already have it.  Dressing or Band-Aid can be removed the day after surgery.  Once removed, replace the cotton ball in the ear as needed.  Once the dressing is off, and if you have an incision behind your ear with stitches, clean the incision twice daily with soap and water and apply Vaseline or antibiotic ointment after cleaning. If you have paper strips or surgical glue over the incision, Do not apply anything behind the ear.  Bloody drainage from the ear is common.  Call the office if discharge from the ear last longer than 21 days or develops an odor or color.  Water should be kept out of the ear until it is healed. You may shower the day after surgery, if you keep your head dry.  The hair may be shampooed 2 days following surgery, providing water is not allowed into the ear canal.  A cotton ball covered with Vaseline should be used in the ear whenever you are around water.    Bloody discharge from incision area may occur during the first 10 days following surgery.  If this persists or increases, please call the office.  A full sensation with popping sounds may be noticed during the healing process.  DO NOT BLOW YOUR NOSE FOR THREE WEEKS FOLLOWING SURGERY. If you sneeze, do so with your mouth open for three weeks following surgery. Do not use a straw to drink beverages for 3 weeks following surgery.  Do not use Q-Tips or put anything in the canal, until approved by your doctor.  Do not be concerned regarding your hearing for a period of six to eight weeks following surgery.  Your hearing will be evaluated at this time; until then, your hearing may sound muffled  and your voice may echo in your ear during speech.  Minor swelling of the face on the same side of the surgery is not uncommon.  Small bruising near the eye or mouth is not uncommon from the facial nerve monitor.  Dizziness, ringing in the ear, and taste disturbance after surgery are common. Call if severe.   You might notice pain when chewing, please use soft diet for 2 weeks if you experience this.  No lifting (more than 10 lbs) or straining until follow up.  You will be discharged on pain medications and usually antibiotics.  You may resume your routine medications as directed, unless you have been instructed otherwise by the prescribing healthcare provider.  Should you experience any difficulty upon returning home, or if you simply have questions, please contact us.  As your surgeons, we are most familiar with your operation and postoperative procedures.  We are accessible by telephone 24 hours a day, 7 days a week.  Once we have assessed your situation, we will be prepared to make specific suggestions for your care.

## 2024-10-18 NOTE — DISCHARGE SUMMARY
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 Patient taken to the OR for a R translab approach craniotomy for tumor resection. She also had a lumbar drain placed in the beginning of the case, that ws later removed at the end of the case. PT/OT evaluated patient and recommended low intensity level of continued therapy for which patient provided with outpatient prescriptions for vestibular therapy. The rest of the hospital course was complicated by diplopia, for which ophthalmology was consulted and attributed to optic disc edema 2/2 post-op changes. The recommendation was for eye patching and further outpatient follow-up. On the day of discharge, the pt was tolerating a diet, pain was controlled on PO pain medication, and they were ambulating and voiding spontaneously. They were discharged in stable condition with detailed instructions and scheduled outpatient follow up.       Pertinent Physical Exam At Time of Discharge  Constitutional  Appearance: patient appears well-developed and well-nourished,  Communication/Voice: communication appropriate for developmental age;  Head and Face  Appearance: head appears normal and face appears normal;  Ear  Hearing: impaired to conversational voice;  Neurovestibular  Psychiatric: mood anxious;  HB 2/6  Breathing well on RA  No nystagmus    Home Medications     Medication List      START taking these medications     hydrOXYzine HCL 25 mg tablet; Commonly known as: Atarax; Take 1 tablet    (25 mg) by mouth every 8 hours if needed for anxiety for up to 7 days.   ondansetron ODT 4 mg disintegrating tablet; Commonly known as:   Zofran-ODT; Take 1 tablet (4 mg) by mouth every 8 hours if needed for   nausea or vomiting for up to 7 days.   oxyCODONE 5 mg immediate release tablet; Commonly known as: Roxicodone;   Take 1 tablet (5 mg) by mouth every 6 hours if needed for severe pain (7 -   10) for up to 7 days.   sennosides 8.6 mg tablet; Commonly known as: Senokot; Take 1 tablet (8.6   mg) by mouth once daily for 7 days.     STOP taking these medications     chlorhexidine 4 % external liquid; Commonly known as: Hibiclens       Outpatient Follow-Up  Future Appointments   Date Time Provider Department Center   11/1/2024  1:45 PM Estella Conrad MD [id number]   11/25/2024  1:15 PM Boy Morgan MD [id number]       Angel Hector MD

## 2024-10-27 ENCOUNTER — HOSPITAL ENCOUNTER (EMERGENCY)
Facility: HOSPITAL | Age: 29
Discharge: HOME | End: 2024-10-27
Attending: EMERGENCY MEDICINE
Payer: MEDICAID

## 2024-10-27 VITALS
RESPIRATION RATE: 17 BRPM | DIASTOLIC BLOOD PRESSURE: 105 MMHG | BODY MASS INDEX: 42.35 KG/M2 | HEIGHT: 63 IN | HEART RATE: 83 BPM | WEIGHT: 239 LBS | OXYGEN SATURATION: 100 % | TEMPERATURE: 97.5 F | SYSTOLIC BLOOD PRESSURE: 164 MMHG

## 2024-10-27 DIAGNOSIS — M54.16 LUMBAR RADICULOPATHY: Primary | ICD-10-CM

## 2024-10-27 LAB
ABO GROUP (TYPE) IN BLOOD: NORMAL
ALBUMIN SERPL BCP-MCNC: 4.2 G/DL (ref 3.4–5)
ALP SERPL-CCNC: 55 U/L (ref 33–110)
ALT SERPL W P-5'-P-CCNC: 20 U/L (ref 7–45)
ANION GAP SERPL CALC-SCNC: 10 MMOL/L (ref 10–20)
ANTIBODY SCREEN: NORMAL
AST SERPL W P-5'-P-CCNC: 14 U/L (ref 9–39)
BASOPHILS # BLD AUTO: 0.05 X10*3/UL (ref 0–0.1)
BASOPHILS NFR BLD AUTO: 0.4 %
BILIRUB SERPL-MCNC: 0.3 MG/DL (ref 0–1.2)
BUN SERPL-MCNC: 14 MG/DL (ref 6–23)
CALCIUM SERPL-MCNC: 9.3 MG/DL (ref 8.6–10.6)
CHLORIDE SERPL-SCNC: 102 MMOL/L (ref 98–107)
CO2 SERPL-SCNC: 30 MMOL/L (ref 21–32)
CREAT SERPL-MCNC: 0.74 MG/DL (ref 0.5–1.05)
CRP SERPL-MCNC: 0.64 MG/DL
EGFRCR SERPLBLD CKD-EPI 2021: >90 ML/MIN/1.73M*2
EOSINOPHIL # BLD AUTO: 0.36 X10*3/UL (ref 0–0.7)
EOSINOPHIL NFR BLD AUTO: 2.8 %
ERYTHROCYTE [DISTWIDTH] IN BLOOD BY AUTOMATED COUNT: 12.6 % (ref 11.5–14.5)
ERYTHROCYTE [SEDIMENTATION RATE] IN BLOOD BY WESTERGREN METHOD: 28 MM/H (ref 0–20)
GLUCOSE SERPL-MCNC: 77 MG/DL (ref 74–99)
HCT VFR BLD AUTO: 36.4 % (ref 36–46)
HGB BLD-MCNC: 12.6 G/DL (ref 12–16)
HOLD SPECIMEN: NORMAL
IMM GRANULOCYTES # BLD AUTO: 0.26 X10*3/UL (ref 0–0.7)
IMM GRANULOCYTES NFR BLD AUTO: 2 % (ref 0–0.9)
LYMPHOCYTES # BLD AUTO: 3.95 X10*3/UL (ref 1.2–4.8)
LYMPHOCYTES NFR BLD AUTO: 30.8 %
MCH RBC QN AUTO: 29.1 PG (ref 26–34)
MCHC RBC AUTO-ENTMCNC: 34.6 G/DL (ref 32–36)
MCV RBC AUTO: 84 FL (ref 80–100)
MONOCYTES # BLD AUTO: 0.77 X10*3/UL (ref 0.1–1)
MONOCYTES NFR BLD AUTO: 6 %
NEUTROPHILS # BLD AUTO: 7.44 X10*3/UL (ref 1.2–7.7)
NEUTROPHILS NFR BLD AUTO: 58 %
NRBC BLD-RTO: 0 /100 WBCS (ref 0–0)
PLATELET # BLD AUTO: 265 X10*3/UL (ref 150–450)
POTASSIUM SERPL-SCNC: 4 MMOL/L (ref 3.5–5.3)
PROT SERPL-MCNC: 6.8 G/DL (ref 6.4–8.2)
RBC # BLD AUTO: 4.33 X10*6/UL (ref 4–5.2)
RH FACTOR (ANTIGEN D): NORMAL
SODIUM SERPL-SCNC: 138 MMOL/L (ref 136–145)
WBC # BLD AUTO: 12.8 X10*3/UL (ref 4.4–11.3)

## 2024-10-27 PROCEDURE — 80053 COMPREHEN METABOLIC PANEL: CPT | Performed by: PHYSICIAN ASSISTANT

## 2024-10-27 PROCEDURE — 36415 COLL VENOUS BLD VENIPUNCTURE: CPT | Performed by: PHYSICIAN ASSISTANT

## 2024-10-27 PROCEDURE — 96374 THER/PROPH/DIAG INJ IV PUSH: CPT

## 2024-10-27 PROCEDURE — 85652 RBC SED RATE AUTOMATED: CPT | Performed by: PHYSICIAN ASSISTANT

## 2024-10-27 PROCEDURE — 86901 BLOOD TYPING SEROLOGIC RH(D): CPT | Performed by: PHYSICIAN ASSISTANT

## 2024-10-27 PROCEDURE — 99284 EMERGENCY DEPT VISIT MOD MDM: CPT

## 2024-10-27 PROCEDURE — 96375 TX/PRO/DX INJ NEW DRUG ADDON: CPT

## 2024-10-27 PROCEDURE — 85025 COMPLETE CBC W/AUTO DIFF WBC: CPT | Performed by: PHYSICIAN ASSISTANT

## 2024-10-27 PROCEDURE — 2500000001 HC RX 250 WO HCPCS SELF ADMINISTERED DRUGS (ALT 637 FOR MEDICARE OP): Mod: SE

## 2024-10-27 PROCEDURE — 2500000001 HC RX 250 WO HCPCS SELF ADMINISTERED DRUGS (ALT 637 FOR MEDICARE OP): Mod: SE | Performed by: PHYSICIAN ASSISTANT

## 2024-10-27 PROCEDURE — 99285 EMERGENCY DEPT VISIT HI MDM: CPT | Performed by: PHYSICIAN ASSISTANT

## 2024-10-27 PROCEDURE — 86140 C-REACTIVE PROTEIN: CPT | Performed by: PHYSICIAN ASSISTANT

## 2024-10-27 PROCEDURE — 2500000004 HC RX 250 GENERAL PHARMACY W/ HCPCS (ALT 636 FOR OP/ED): Mod: SE | Performed by: PHYSICIAN ASSISTANT

## 2024-10-27 RX ORDER — CYCLOBENZAPRINE HCL 10 MG
10 TABLET ORAL 3 TIMES DAILY PRN
Qty: 21 TABLET | Refills: 0 | Status: SHIPPED | OUTPATIENT
Start: 2024-10-27 | End: 2024-11-03

## 2024-10-27 RX ORDER — NAPROXEN 500 MG/1
TABLET ORAL
Status: COMPLETED
Start: 2024-10-27 | End: 2024-10-27

## 2024-10-27 RX ORDER — HYDROMORPHONE HYDROCHLORIDE 1 MG/ML
1 INJECTION, SOLUTION INTRAMUSCULAR; INTRAVENOUS; SUBCUTANEOUS ONCE
Status: COMPLETED | OUTPATIENT
Start: 2024-10-27 | End: 2024-10-27

## 2024-10-27 RX ORDER — PANTOPRAZOLE SODIUM 20 MG/1
20 TABLET, DELAYED RELEASE ORAL DAILY
Qty: 14 TABLET | Refills: 0 | Status: SHIPPED | OUTPATIENT
Start: 2024-10-27 | End: 2024-11-10

## 2024-10-27 RX ORDER — CYCLOBENZAPRINE HCL 10 MG
10 TABLET ORAL ONCE
Status: COMPLETED | OUTPATIENT
Start: 2024-10-27 | End: 2024-10-27

## 2024-10-27 RX ORDER — METHYLPREDNISOLONE 4 MG/1
TABLET ORAL
Qty: 21 TABLET | Refills: 0 | Status: SHIPPED | OUTPATIENT
Start: 2024-10-27 | End: 2024-11-03

## 2024-10-27 RX ORDER — DEXAMETHASONE SODIUM PHOSPHATE 10 MG/ML
10 INJECTION INTRAMUSCULAR; INTRAVENOUS ONCE
Status: COMPLETED | OUTPATIENT
Start: 2024-10-27 | End: 2024-10-27

## 2024-10-27 RX ORDER — NAPROXEN 500 MG/1
500 TABLET ORAL ONCE
Status: COMPLETED | OUTPATIENT
Start: 2024-10-27 | End: 2024-10-27

## 2024-10-27 RX ORDER — NAPROXEN 500 MG/1
500 TABLET ORAL
Qty: 30 TABLET | Refills: 0 | Status: SHIPPED | OUTPATIENT
Start: 2024-10-27 | End: 2024-11-11

## 2024-10-27 ASSESSMENT — PAIN SCALES - GENERAL
PAINLEVEL_OUTOF10: 7
PAINLEVEL_OUTOF10: 4

## 2024-10-27 ASSESSMENT — COLUMBIA-SUICIDE SEVERITY RATING SCALE - C-SSRS
2. HAVE YOU ACTUALLY HAD ANY THOUGHTS OF KILLING YOURSELF?: NO
1. IN THE PAST MONTH, HAVE YOU WISHED YOU WERE DEAD OR WISHED YOU COULD GO TO SLEEP AND NOT WAKE UP?: NO
6. HAVE YOU EVER DONE ANYTHING, STARTED TO DO ANYTHING, OR PREPARED TO DO ANYTHING TO END YOUR LIFE?: NO

## 2024-10-27 ASSESSMENT — PAIN - FUNCTIONAL ASSESSMENT: PAIN_FUNCTIONAL_ASSESSMENT: 0-10

## 2024-10-27 ASSESSMENT — PAIN DESCRIPTION - ORIENTATION: ORIENTATION: LEFT

## 2024-10-27 ASSESSMENT — PAIN DESCRIPTION - LOCATION: LOCATION: BUTTOCKS

## 2024-11-01 ENCOUNTER — APPOINTMENT (OUTPATIENT)
Dept: OTOLARYNGOLOGY | Facility: CLINIC | Age: 29
End: 2024-11-01
Payer: MEDICAID

## 2024-11-01 VITALS — HEIGHT: 63 IN | BODY MASS INDEX: 41.82 KG/M2 | WEIGHT: 236 LBS

## 2024-11-01 DIAGNOSIS — D33.3 ACOUSTIC NEUROMA (MULTI): ICD-10-CM

## 2024-11-01 DIAGNOSIS — H90.41 SENSORINEURAL HEARING LOSS (SNHL) OF RIGHT EAR WITH UNRESTRICTED HEARING OF LEFT EAR: Primary | ICD-10-CM

## 2024-11-01 PROCEDURE — 99024 POSTOP FOLLOW-UP VISIT: CPT | Performed by: OTOLARYNGOLOGY

## 2024-11-01 PROCEDURE — 1036F TOBACCO NON-USER: CPT | Performed by: OTOLARYNGOLOGY

## 2024-11-01 PROCEDURE — 3008F BODY MASS INDEX DOCD: CPT | Performed by: OTOLARYNGOLOGY

## 2024-11-01 ASSESSMENT — PATIENT HEALTH QUESTIONNAIRE - PHQ9
SUM OF ALL RESPONSES TO PHQ9 QUESTIONS 1 AND 2: 0
2. FEELING DOWN, DEPRESSED OR HOPELESS: NOT AT ALL
1. LITTLE INTEREST OR PLEASURE IN DOING THINGS: NOT AT ALL

## 2024-11-07 ENCOUNTER — APPOINTMENT (OUTPATIENT)
Dept: OCCUPATIONAL THERAPY | Facility: CLINIC | Age: 29
End: 2024-11-07
Payer: MEDICAID

## 2024-11-13 ENCOUNTER — HOSPITAL ENCOUNTER (EMERGENCY)
Facility: HOSPITAL | Age: 29
Discharge: HOME | End: 2024-11-14
Attending: EMERGENCY MEDICINE
Payer: MEDICAID

## 2024-11-13 ENCOUNTER — TELEPHONE (OUTPATIENT)
Dept: OTOLARYNGOLOGY | Facility: CLINIC | Age: 29
End: 2024-11-13
Payer: MEDICAID

## 2024-11-13 ENCOUNTER — CLINICAL SUPPORT (OUTPATIENT)
Dept: EMERGENCY MEDICINE | Facility: HOSPITAL | Age: 29
End: 2024-11-13
Payer: MEDICAID

## 2024-11-13 DIAGNOSIS — R51.9 ACUTE NONINTRACTABLE HEADACHE, UNSPECIFIED HEADACHE TYPE: Primary | ICD-10-CM

## 2024-11-13 DIAGNOSIS — R11.2 NAUSEA AND VOMITING, UNSPECIFIED VOMITING TYPE: ICD-10-CM

## 2024-11-13 LAB
ALBUMIN SERPL BCP-MCNC: 4.5 G/DL (ref 3.4–5)
ALP SERPL-CCNC: 54 U/L (ref 33–110)
ALT SERPL W P-5'-P-CCNC: 16 U/L (ref 7–45)
ANION GAP SERPL CALC-SCNC: 14 MMOL/L (ref 10–20)
APTT PPP: 33 SECONDS (ref 27–38)
AST SERPL W P-5'-P-CCNC: 12 U/L (ref 9–39)
ATRIAL RATE: 103 BPM
BASOPHILS # BLD AUTO: 0.02 X10*3/UL (ref 0–0.1)
BASOPHILS NFR BLD AUTO: 0.2 %
BILIRUB SERPL-MCNC: 0.8 MG/DL (ref 0–1.2)
BUN SERPL-MCNC: 8 MG/DL (ref 6–23)
CALCIUM SERPL-MCNC: 9.9 MG/DL (ref 8.6–10.6)
CHLORIDE SERPL-SCNC: 101 MMOL/L (ref 98–107)
CO2 SERPL-SCNC: 26 MMOL/L (ref 21–32)
CREAT SERPL-MCNC: 0.83 MG/DL (ref 0.5–1.05)
EGFRCR SERPLBLD CKD-EPI 2021: >90 ML/MIN/1.73M*2
EOSINOPHIL # BLD AUTO: 0.09 X10*3/UL (ref 0–0.7)
EOSINOPHIL NFR BLD AUTO: 0.9 %
ERYTHROCYTE [DISTWIDTH] IN BLOOD BY AUTOMATED COUNT: 12.5 % (ref 11.5–14.5)
GLUCOSE SERPL-MCNC: 98 MG/DL (ref 74–99)
HCT VFR BLD AUTO: 39.1 % (ref 36–46)
HGB BLD-MCNC: 13.7 G/DL (ref 12–16)
IMM GRANULOCYTES # BLD AUTO: 0.04 X10*3/UL (ref 0–0.7)
IMM GRANULOCYTES NFR BLD AUTO: 0.4 % (ref 0–0.9)
INR PPP: 1.1 (ref 0.9–1.1)
LYMPHOCYTES # BLD AUTO: 2.64 X10*3/UL (ref 1.2–4.8)
LYMPHOCYTES NFR BLD AUTO: 26.4 %
MAGNESIUM SERPL-MCNC: 1.73 MG/DL (ref 1.6–2.4)
MCH RBC QN AUTO: 29.1 PG (ref 26–34)
MCHC RBC AUTO-ENTMCNC: 35 G/DL (ref 32–36)
MCV RBC AUTO: 83 FL (ref 80–100)
MONOCYTES # BLD AUTO: 0.61 X10*3/UL (ref 0.1–1)
MONOCYTES NFR BLD AUTO: 6.1 %
NEUTROPHILS # BLD AUTO: 6.61 X10*3/UL (ref 1.2–7.7)
NEUTROPHILS NFR BLD AUTO: 66 %
NRBC BLD-RTO: 0 /100 WBCS (ref 0–0)
P AXIS: 57 DEGREES
P OFFSET: 195 MS
P ONSET: 144 MS
PLATELET # BLD AUTO: 259 X10*3/UL (ref 150–450)
POTASSIUM SERPL-SCNC: 3.7 MMOL/L (ref 3.5–5.3)
PR INTERVAL: 148 MS
PROT SERPL-MCNC: 7.8 G/DL (ref 6.4–8.2)
PROTHROMBIN TIME: 12.1 SECONDS (ref 9.8–12.8)
Q ONSET: 218 MS
QRS COUNT: 17 BEATS
QRS DURATION: 80 MS
QT INTERVAL: 318 MS
QTC CALCULATION(BAZETT): 416 MS
QTC FREDERICIA: 381 MS
R AXIS: 75 DEGREES
RBC # BLD AUTO: 4.7 X10*6/UL (ref 4–5.2)
SODIUM SERPL-SCNC: 137 MMOL/L (ref 136–145)
T AXIS: 47 DEGREES
T OFFSET: 377 MS
VENTRICULAR RATE: 103 BPM
WBC # BLD AUTO: 10 X10*3/UL (ref 4.4–11.3)

## 2024-11-13 PROCEDURE — 96374 THER/PROPH/DIAG INJ IV PUSH: CPT

## 2024-11-13 PROCEDURE — 36415 COLL VENOUS BLD VENIPUNCTURE: CPT | Performed by: EMERGENCY MEDICINE

## 2024-11-13 PROCEDURE — 85025 COMPLETE CBC W/AUTO DIFF WBC: CPT | Performed by: EMERGENCY MEDICINE

## 2024-11-13 PROCEDURE — 86140 C-REACTIVE PROTEIN: CPT | Performed by: EMERGENCY MEDICINE

## 2024-11-13 PROCEDURE — 85652 RBC SED RATE AUTOMATED: CPT | Performed by: EMERGENCY MEDICINE

## 2024-11-13 PROCEDURE — 93005 ELECTROCARDIOGRAM TRACING: CPT

## 2024-11-13 PROCEDURE — 85730 THROMBOPLASTIN TIME PARTIAL: CPT | Performed by: EMERGENCY MEDICINE

## 2024-11-13 PROCEDURE — 96361 HYDRATE IV INFUSION ADD-ON: CPT

## 2024-11-13 PROCEDURE — 83735 ASSAY OF MAGNESIUM: CPT | Performed by: EMERGENCY MEDICINE

## 2024-11-13 PROCEDURE — 80053 COMPREHEN METABOLIC PANEL: CPT | Performed by: EMERGENCY MEDICINE

## 2024-11-13 PROCEDURE — 85610 PROTHROMBIN TIME: CPT | Performed by: EMERGENCY MEDICINE

## 2024-11-13 PROCEDURE — 96372 THER/PROPH/DIAG INJ SC/IM: CPT

## 2024-11-13 PROCEDURE — 99285 EMERGENCY DEPT VISIT HI MDM: CPT | Mod: 25

## 2024-11-13 PROCEDURE — 2500000004 HC RX 250 GENERAL PHARMACY W/ HCPCS (ALT 636 FOR OP/ED): Mod: SE

## 2024-11-13 RX ORDER — KETOROLAC TROMETHAMINE 15 MG/ML
15 INJECTION, SOLUTION INTRAMUSCULAR; INTRAVENOUS ONCE
Status: COMPLETED | OUTPATIENT
Start: 2024-11-13 | End: 2024-11-13

## 2024-11-13 RX ORDER — ONDANSETRON HYDROCHLORIDE 2 MG/ML
4 INJECTION, SOLUTION INTRAVENOUS ONCE
Status: COMPLETED | OUTPATIENT
Start: 2024-11-13 | End: 2024-11-13

## 2024-11-13 ASSESSMENT — PAIN DESCRIPTION - PAIN TYPE: TYPE: ACUTE PAIN

## 2024-11-13 ASSESSMENT — PAIN SCALES - GENERAL
PAINLEVEL_OUTOF10: 8
PAINLEVEL_OUTOF10: 10 - WORST POSSIBLE PAIN

## 2024-11-13 ASSESSMENT — PAIN - FUNCTIONAL ASSESSMENT
PAIN_FUNCTIONAL_ASSESSMENT: 0-10
PAIN_FUNCTIONAL_ASSESSMENT: 0-10

## 2024-11-13 ASSESSMENT — COLUMBIA-SUICIDE SEVERITY RATING SCALE - C-SSRS
6. HAVE YOU EVER DONE ANYTHING, STARTED TO DO ANYTHING, OR PREPARED TO DO ANYTHING TO END YOUR LIFE?: NO
2. HAVE YOU ACTUALLY HAD ANY THOUGHTS OF KILLING YOURSELF?: NO
1. IN THE PAST MONTH, HAVE YOU WISHED YOU WERE DEAD OR WISHED YOU COULD GO TO SLEEP AND NOT WAKE UP?: NO

## 2024-11-13 ASSESSMENT — PAIN DESCRIPTION - LOCATION
LOCATION: HEAD
LOCATION: HEAD

## 2024-11-13 NOTE — ED TRIAGE NOTES
Pt presented to ED for post op complaints after a brain tumor removal on oct 13. Pt endorses light sensitivity, nausea, vomiting (cannot keep anything down), HA/migraine. Pt states that she feels very dehydrated.

## 2024-11-13 NOTE — TELEPHONE ENCOUNTER
Spoke with patient regarding her [employer] this morning. Per patient, she has had 3 days of a severe headache, light sensitivity, and inability to sit up for longer periods of time. Within the last 48H she has experienced nausea/vomiting and inability to keep food or fluids down. Since vomiting her sciatic pain has also returned. She has not had a fever, drainage from her incision, or recent illness. Symptoms discussed with Dr. Conrad and agrees patient should come to [facility] ED for further evaluation. [facility] [employer] [facility] to be notified of patient. Patient understands next steps and is in agreement with plan. She states that she should arrive this afternoon once her  is able to assist with transportation.

## 2024-11-14 ENCOUNTER — CLINICAL SUPPORT (OUTPATIENT)
Dept: EMERGENCY MEDICINE | Facility: HOSPITAL | Age: 29
End: 2024-11-14
Payer: MEDICAID

## 2024-11-14 ENCOUNTER — APPOINTMENT (OUTPATIENT)
Dept: RADIOLOGY | Facility: HOSPITAL | Age: 29
End: 2024-11-14
Payer: MEDICAID

## 2024-11-14 VITALS
OXYGEN SATURATION: 99 % | BODY MASS INDEX: 41.82 KG/M2 | RESPIRATION RATE: 16 BRPM | DIASTOLIC BLOOD PRESSURE: 72 MMHG | HEIGHT: 63 IN | WEIGHT: 236 LBS | TEMPERATURE: 98 F | HEART RATE: 71 BPM | SYSTOLIC BLOOD PRESSURE: 112 MMHG

## 2024-11-14 LAB
ALBUMIN SERPL BCP-MCNC: 3.6 G/DL (ref 3.4–5)
ALP SERPL-CCNC: 42 U/L (ref 33–110)
ALT SERPL W P-5'-P-CCNC: 10 U/L (ref 7–45)
ANION GAP SERPL CALC-SCNC: 14 MMOL/L (ref 10–20)
APPEARANCE UR: CLEAR
APTT PPP: 29 SECONDS (ref 27–38)
AST SERPL W P-5'-P-CCNC: 9 U/L (ref 9–39)
BILIRUB SERPL-MCNC: 0.7 MG/DL (ref 0–1.2)
BILIRUB UR STRIP.AUTO-MCNC: NEGATIVE MG/DL
BUN SERPL-MCNC: 9 MG/DL (ref 6–23)
CALCIUM SERPL-MCNC: 8.6 MG/DL (ref 8.6–10.6)
CHLORIDE SERPL-SCNC: 107 MMOL/L (ref 98–107)
CO2 SERPL-SCNC: 22 MMOL/L (ref 21–32)
COLOR UR: YELLOW
CREAT SERPL-MCNC: 0.76 MG/DL (ref 0.5–1.05)
CRP SERPL-MCNC: 0.82 MG/DL
EGFRCR SERPLBLD CKD-EPI 2021: >90 ML/MIN/1.73M*2
ERYTHROCYTE [SEDIMENTATION RATE] IN BLOOD BY WESTERGREN METHOD: 22 MM/H (ref 0–20)
GLUCOSE SERPL-MCNC: 85 MG/DL (ref 74–99)
GLUCOSE UR STRIP.AUTO-MCNC: NORMAL MG/DL
HOLD SPECIMEN: NORMAL
HOLD SPECIMEN: NORMAL
INR PPP: 1.1 (ref 0.9–1.1)
KETONES UR STRIP.AUTO-MCNC: NEGATIVE MG/DL
LEUKOCYTE ESTERASE UR QL STRIP.AUTO: NEGATIVE
MUCOUS THREADS #/AREA URNS AUTO: NORMAL /LPF
NITRITE UR QL STRIP.AUTO: NEGATIVE
PH UR STRIP.AUTO: 7 [PH]
POTASSIUM SERPL-SCNC: 3.7 MMOL/L (ref 3.5–5.3)
PROT SERPL-MCNC: 6.1 G/DL (ref 6.4–8.2)
PROT UR STRIP.AUTO-MCNC: ABNORMAL MG/DL
PROTHROMBIN TIME: 12.8 SECONDS (ref 9.8–12.8)
RBC # UR STRIP.AUTO: NEGATIVE /UL
RBC #/AREA URNS AUTO: NORMAL /HPF
SODIUM SERPL-SCNC: 139 MMOL/L (ref 136–145)
SP GR UR STRIP.AUTO: 1.05
SQUAMOUS #/AREA URNS AUTO: NORMAL /HPF
UROBILINOGEN UR STRIP.AUTO-MCNC: NORMAL MG/DL
WBC #/AREA URNS AUTO: NORMAL /HPF

## 2024-11-14 PROCEDURE — 70470 CT HEAD/BRAIN W/O & W/DYE: CPT

## 2024-11-14 PROCEDURE — 70553 MRI BRAIN STEM W/O & W/DYE: CPT | Performed by: RADIOLOGY

## 2024-11-14 PROCEDURE — 85610 PROTHROMBIN TIME: CPT

## 2024-11-14 PROCEDURE — 84075 ASSAY ALKALINE PHOSPHATASE: CPT

## 2024-11-14 PROCEDURE — 72148 MRI LUMBAR SPINE W/O DYE: CPT | Performed by: RADIOLOGY

## 2024-11-14 PROCEDURE — 70470 CT HEAD/BRAIN W/O & W/DYE: CPT | Performed by: STUDENT IN AN ORGANIZED HEALTH CARE EDUCATION/TRAINING PROGRAM

## 2024-11-14 PROCEDURE — 99221 1ST HOSP IP/OBS SF/LOW 40: CPT | Performed by: OTOLARYNGOLOGY

## 2024-11-14 PROCEDURE — 2500000001 HC RX 250 WO HCPCS SELF ADMINISTERED DRUGS (ALT 637 FOR MEDICARE OP): Mod: SE

## 2024-11-14 PROCEDURE — 36415 COLL VENOUS BLD VENIPUNCTURE: CPT

## 2024-11-14 PROCEDURE — 72148 MRI LUMBAR SPINE W/O DYE: CPT

## 2024-11-14 PROCEDURE — 70553 MRI BRAIN STEM W/O & W/DYE: CPT

## 2024-11-14 PROCEDURE — 2550000001 HC RX 255 CONTRASTS: Mod: SE | Performed by: EMERGENCY MEDICINE

## 2024-11-14 PROCEDURE — 2500000001 HC RX 250 WO HCPCS SELF ADMINISTERED DRUGS (ALT 637 FOR MEDICARE OP): Mod: SE | Performed by: STUDENT IN AN ORGANIZED HEALTH CARE EDUCATION/TRAINING PROGRAM

## 2024-11-14 PROCEDURE — 96375 TX/PRO/DX INJ NEW DRUG ADDON: CPT | Mod: 59

## 2024-11-14 PROCEDURE — 70491 CT SOFT TISSUE NECK W/DYE: CPT | Performed by: STUDENT IN AN ORGANIZED HEALTH CARE EDUCATION/TRAINING PROGRAM

## 2024-11-14 PROCEDURE — A9575 INJ GADOTERATE MEGLUMI 0.1ML: HCPCS | Mod: SE | Performed by: EMERGENCY MEDICINE

## 2024-11-14 PROCEDURE — 2500000004 HC RX 250 GENERAL PHARMACY W/ HCPCS (ALT 636 FOR OP/ED): Mod: SE

## 2024-11-14 PROCEDURE — 70491 CT SOFT TISSUE NECK W/DYE: CPT

## 2024-11-14 PROCEDURE — 96361 HYDRATE IV INFUSION ADD-ON: CPT

## 2024-11-14 PROCEDURE — 81001 URINALYSIS AUTO W/SCOPE: CPT

## 2024-11-14 PROCEDURE — 93005 ELECTROCARDIOGRAM TRACING: CPT

## 2024-11-14 PROCEDURE — 2500000004 HC RX 250 GENERAL PHARMACY W/ HCPCS (ALT 636 FOR OP/ED): Mod: SE | Performed by: STUDENT IN AN ORGANIZED HEALTH CARE EDUCATION/TRAINING PROGRAM

## 2024-11-14 PROCEDURE — 96376 TX/PRO/DX INJ SAME DRUG ADON: CPT | Mod: 59

## 2024-11-14 RX ORDER — ACETAMINOPHEN 325 MG/1
975 TABLET ORAL ONCE
Status: COMPLETED | OUTPATIENT
Start: 2024-11-14 | End: 2024-11-14

## 2024-11-14 RX ORDER — ONDANSETRON 4 MG/1
4 TABLET, FILM COATED ORAL EVERY 6 HOURS
Qty: 12 TABLET | Refills: 0 | Status: SHIPPED | OUTPATIENT
Start: 2024-11-14 | End: 2024-11-17

## 2024-11-14 RX ORDER — BUTALBITAL, ACETAMINOPHEN AND CAFFEINE 50; 325; 40 MG/1; MG/1; MG/1
1 TABLET ORAL EVERY 6 HOURS PRN
Qty: 20 TABLET | Refills: 0 | Status: SHIPPED | OUTPATIENT
Start: 2024-11-14 | End: 2024-11-19

## 2024-11-14 RX ORDER — ONDANSETRON HYDROCHLORIDE 2 MG/ML
4 INJECTION, SOLUTION INTRAVENOUS ONCE
Status: COMPLETED | OUTPATIENT
Start: 2024-11-14 | End: 2024-11-14

## 2024-11-14 RX ORDER — BUTALBITAL, ACETAMINOPHEN AND CAFFEINE 50; 325; 40 MG/1; MG/1; MG/1
2 TABLET ORAL ONCE
Status: COMPLETED | OUTPATIENT
Start: 2024-11-14 | End: 2024-11-14

## 2024-11-14 RX ORDER — METOCLOPRAMIDE HYDROCHLORIDE 5 MG/ML
10 INJECTION INTRAMUSCULAR; INTRAVENOUS ONCE
Status: COMPLETED | OUTPATIENT
Start: 2024-11-14 | End: 2024-11-14

## 2024-11-14 RX ORDER — GADOTERATE MEGLUMINE 376.9 MG/ML
20 INJECTION INTRAVENOUS
Status: COMPLETED | OUTPATIENT
Start: 2024-11-14 | End: 2024-11-14

## 2024-11-14 ASSESSMENT — PAIN - FUNCTIONAL ASSESSMENT
PAIN_FUNCTIONAL_ASSESSMENT: 0-10
PAIN_FUNCTIONAL_ASSESSMENT: 0-10

## 2024-11-14 ASSESSMENT — PAIN SCALES - GENERAL
PAINLEVEL_OUTOF10: 6
PAINLEVEL_OUTOF10: 3

## 2024-11-14 ASSESSMENT — LIFESTYLE VARIABLES
EVER HAD A DRINK FIRST THING IN THE MORNING TO STEADY YOUR NERVES TO GET RID OF A HANGOVER: NO
HAVE YOU EVER FELT YOU SHOULD CUT DOWN ON YOUR DRINKING: NO
TOTAL SCORE: 0
EVER FELT BAD OR GUILTY ABOUT YOUR DRINKING: NO
HAVE PEOPLE ANNOYED YOU BY CRITICIZING YOUR DRINKING: NO

## 2024-11-14 NOTE — PROGRESS NOTES
Emergency Department Transition of Care Note       Signout   I received Sandy Cao in signout from Dr. Ding.  Please see the ED Provider Note for all HPI, PE and MDM up to the time of signout at 0700.  This is in addition to the primary record.    In brief Sandy Cao is an 28 y.o. female presenting for headache and nausea    At the time of signout we were awaiting:  MRI imaging, Neurosurgery and ENT recommendation    ED Course & Medical Decision Making   Medical Decision Making:  Under my care, MRI brain was read by radiology as showing expected evolution of postsurgical changes from right  translabyrinthine craniotomy with overlying fluid collection which does not restrict diffusion. There is a similar appearance of a small amount of fat extending extracranially along the anterior margin of the cranioplasty. MRI lumbar spine showed no acute processes. The patient was evaluated by neurosurgery and ENT in the ED. ENT placed a head pressure dressing on the patient. After the dressing was placed, the patient developed a headache for the patient, for which the patient was given a dose of Fioricet with complete resolution of symptoms.  Patient was cleared for discharge by both ENT and Neurosurgery. Patient was discharged home with scripts for Zofran and Fioricet as well as instructed to follow-up with ENT and neurosurgery. The patient was informed of the results. The patient felt comfortable being discharged home. Return precautions were provided, for which the patient expressed understanding. The patient was discharged home in stable condition. They should feel free to return to the Emergency Department at any time should their condition worsen or should they have any questions or concerns.     ED Course:  ED Course as of 11/14/24 1443   Wed Nov 13, 2024 2237 ECG 12 lead  Otherwise normal ECG []   Thu Nov 14, 2024   0149 ECG 12 lead  ECG, per my read, with normal sinus rhythm, heart rate 87 bpm, normal  axis and intervals, no T wave inversions, no ST segment abnormalities. No priors before this visit.   [MB]   0345 CT head w and wo IV contrast  1. Postsurgical changes of right translabyrinthine craniotomy, as  above, with residual low-density material within the resection cavity  and improved hyperdense material within the medial aspect of the  surgical bed without significant mass effect on the veronica.  2. No hydrocephalus.  3. No acute intracranial abnormality.   [MC]   0423 CT soft tissue neck w IV contrast  No evidence of significant cervical adenopathy or a soft tissue mass  in the neck.   [MC]   0508 MR brain w and wo IV contrast  . Expected evolution of postsurgical changes from right  translabyrinthine craniotomy with overlying fluid collection which  does not restrict diffusion. Findings may reflect a seroma or  hematoma.  2. Similar appearance of fat extending through the cranioplasty which  may represent dehiscence.   [MC]   0516 MR lumbar spine wo IV contrast  Normal MRI of the lumbar spine. [MC]      ED Course User Index  [MB] Cora Stanton MD  [] Jess Ding DPM         Diagnoses as of 11/14/24 1443   Acute nonintractable headache, unspecified headache type   Nausea and vomiting, unspecified vomiting type       Disposition   As a result of the work-up, the patient was discharged home.  she was informed of her diagnosis and instructed to come back with any concerns or worsening of condition.  she and was agreeable to the plan as discussed above.  she was given the opportunity to ask questions.  All of the patient's questions were answered.    Procedures   Procedures    Dalton Dang DO  Emergency Medicine

## 2024-11-14 NOTE — CONSULTS
Ear Nose & Throat Consult Note    ENT DEPARTMENT CONSULTATION NOTE  Name: Sandy Cao  MRN: [id number]  : 1995  Consulting Attending: Cora Stanton MD  Reason for Consult: Post op incision pain    History of Present Illness  The patient is a 28 y.o. female s/p translabyrinthine approach for acoustic neuroma resection 10/14 who presented to [facility] on 2024 for nausea, vomiting, lightheadedness, headache, light sensitivity. There is pain along incision line.    ENT was consulted for post op incision pain, headache, nausea, vomiting, light sensitivity.  She is also complaining of a metallic taste and dysgeusia on the right side of her tongue.    The patient has not been febrile, but has had some chills.  She does not have a leukocytosis.    Review of Systems  14 point review of systems completed and all negative except as noted in HPI.    Past Medical History  Past Medical History:   Diagnosis Date    Acoustic neuroma (Multi)     Anxiety     Asthma     Depression     HL (hearing loss)     Mood disorder (CMS-HCC)     Tinnitus        Past Surgical History  Past Surgical History:   Procedure Laterality Date    ESOPHAGOGASTRODUODENOSCOPY         Allergies  Allergies   Allergen Reactions    Cat Dander Unknown    House Dust Mite Unknown       Medications    Current Facility-Administered Medications:     acetaminophen (Tylenol) tablet 975 mg, 975 mg, oral, Once, Jess Ding DPM    Current Outpatient Medications:     cyclobenzaprine (Flexeril) 10 mg tablet, Take 1 tablet (10 mg) by mouth 3 times a day as needed for muscle spasms for up to 7 days., Disp: 21 tablet, Rfl: 0    hydrOXYzine HCL (Atarax) 25 mg tablet, Take 1 tablet (25 mg) by mouth every 8 hours if needed for anxiety for up to 7 days., Disp: 21 tablet, Rfl: 0    levonorgestrel (Mirena) 20 mcg/24hr IUD, 52 mg by intrauterine route., Disp: , Rfl:     pantoprazole (ProtoNix) 20 mg EC tablet, Take 1 tablet (20 mg) by mouth once daily for 14  "days. Do not crush, chew, or split., Disp: 14 tablet, Rfl: 0    Family History  No family history on file.    Social History  Social History     Socioeconomic History    Marital status: Single     Spouse name: Not on file    Number of children: Not on file    Years of education: Not on file    Highest education level: Not on file   Occupational History    Not on file   Tobacco Use    Smoking status: Never    Smokeless tobacco: Never   Substance and Sexual Activity    Alcohol use: Not Currently    Drug use: Never    Sexual activity: Not on file   Other Topics Concern    Not on file   Social History Narrative    Not on file     Social Drivers of Health     Financial Resource Strain: Not on file   Food Insecurity: Not on file   Transportation Needs: Not on file   Physical Activity: Not on file   Stress: Not on file   Social Connections: Not on file   Intimate Partner Violence: Not on file   Housing Stability: Not on file       Vital Signs  Heart Rate:  []   Temperature:  [36.7 °C (98 °F)]   Respirations:  [16-18]   BP: (104-156)/(67-90)   Height:  [160 cm (5' 3\")]   Weight:  [107 kg (236 lb)]   Pulse Ox:  [97 %-99 %]     Physical Examination  GEN: The patient appears stated age in no acute distress  VOICE: No dysphonia, volume is appropriate, no pitch/voice breaks   RESP: Unlabored on room air with no audible stertor or stridor  CV: Clinically well perfused   EYES: EOM grossly intact with no scleral icterus  NEURO: Alert and oriented with no focal deficits and CN II-XII symmetric and intact bilaterally  HEAD: Scalp is normocephalic and atraumatic  FACE: Sensation intact to light touch in V1-V3, facial nerve intact bilaterally  EARS: right postauricular incision inferior aspect with erythema and mild edema  NOSE: External nose appears normal, no significant septal deviation, anterior rhinoscopy limited with no active bleeding or lesions  OC: Normal lips, normal buccal mucosa, normal alveolar ridge, normal floor of " mouth, normal tongue, normal hard palate, normal retromolar trigone  OP: normal pharyngeal walls, normal soft palate  NECK: Trachea midline, no significant lymphadenopathy, no nuchal rigidity, some neck stiffness with flexion and extension, full neck flexion limited by body habitus    Laboratory and Data   Latest Reference Range & Units 11/13/24 16:52   WBC 4.4 - 11.3 x10*3/uL 10.0   nRBC 0.0 - 0.0 /100 WBCs 0.0   RBC 4.00 - 5.20 x10*6/uL 4.70   HEMOGLOBIN 12.0 - 16.0 g/dL 13.7   HEMATOCRIT 36.0 - 46.0 % 39.1   MCV 80 - 100 fL 83   MCH 26.0 - 34.0 pg 29.1   MCHC 32.0 - 36.0 g/dL 35.0   RED CELL DISTRIBUTION WIDTH 11.5 - 14.5 % 12.5   Platelets 150 - 450 x10*3/uL 259       Radiology Reviewed  MR brain w and wo IV contrast    Result Date: 11/14/2024  Interpreted By:  Brian Mullen,  Shannon Ang STUDY: MR BRAIN W AND WO IV CONTRAST;  11/14/2024 4:50 am   INDICATION: Signs/Symptoms:headaches, post-op neuroma excision.     COMPARISON: CT head 11/14/2024   ACCESSION NUMBER(S): [id number]   ORDERING CLINICIAN: RAMA AGARWAL   TECHNIQUE: Axial T2, FLAIR, DWI, gradient echo T2 and T1 weighted images of brain were acquired. Post contrast T1 weighted images were acquired after administration of 20 ML Dotarem gadolinium based intravenous contrast.   FINDINGS: Postsurgical changes from right translabyrinthine craniotomy and resection of right CPA mass with fat grafting into the mastoidectomy bed. Fat is again seen protruding beyond the anterior margin of the mesh repair.   Re-demonstration of a 6.3 x 4.3 x 1.2 cm fluid collection within the soft tissues overlying the cranioplasty without associated diffusion restriction or definite enhancement. There are multiple foci of susceptibility artifact within the collection on gradient echo imaging.   Multiple foci of susceptibility artifact on gradient echo sequences in the surgical bed favored to correspond to known surgical clips. Hemosiderin deposition or  mineralization is not excluded.   The ventricles, sulci, and basal cisterns are within normal limits.   No diffusion restriction abnormality to suggest acute infarct. No mass effect or midline shift. No abnormal parenchymal or leptomeningeal enhancement.   The visualized paranasal sinuses and left mastoid air cells are essentially clear. Status post right mastoidectomy.       1. Expected evolution of postsurgical changes from right translabyrinthine craniotomy with overlying fluid collection which does not restrict diffusion. Findings may reflect a seroma or hematoma. 2. Similar appearance of a small amount of fat extending extra cranially along the anterior margin of the cranioplasty.   I personally reviewed the images/study and I agree with Ashia Joseph DO's (radiology resident) findings as stated. This study was interpreted at [place], [place].   MACRO: None   Signed by: Brian Mullen 11/14/2024 10:26 AM Dictation workstation:   [id number]    MR lumbar spine wo IV contrast    Result Date: 11/14/2024  Interpreted By:  Brian Mullen and Stephens Katherine STUDY: MR LUMBAR SPINE WO IV CONTRAST;  11/14/2024 4:49 am   INDICATION: Signs/Symptoms:headache, post-op neuroma excision.     COMPARISON: None.   ACCESSION NUMBER(S): [id number]   ORDERING CLINICIAN: RAMA AGARWAL   TECHNIQUE: Sagittal T1, T2, STIR, axial T1 and T2 weighted images of the lumbar spine were acquired.   FINDINGS: Alignment: The vertebral alignment is maintained.   Vertebrae/Intervertebral Discs: The vertebral bodies demonstrate expected height. The marrow signal is within normal limits. The intervertebral discs also demonstrate normal signal and morphology.   Conus: The lower thoracic cord appears unremarkable. The conus terminates at .   T12-L1:  No significant spinal canal or neural foraminal stenosis.   L1-2:  No significant spinal canal or neural foraminal stenosis.   L2-3:  No  significant spinal canal or neural foraminal stenosis.   L3-4:  No significant spinal canal or neural foraminal stenosis.   L4-5:  No significant spinal canal or neural foraminal stenosis.   L5-S1:  No significant spinal canal or neural foraminal stenosis.   The prevertebral and posterior paraspinous soft tissues are unremarkable.       Normal MRI of the lumbar spine.   I personally reviewed the images/study and I agree with Ashia Joseph DO's (radiology resident) findings as stated. This study was interpreted at [place], [place].   MACRO: None   Signed by: Brian Mullen 11/14/2024 10:21 AM Dictation workstation:   [id number]    CT head w and wo IV contrast    Result Date: 11/14/2024  Interpreted By:  Kalin Moreno,  and Bridger Kirkpatrick STUDY: CT HEAD W AND WO IV CONTRAST;  11/14/2024 2:38 am   INDICATION: Signs/Symptoms:eval vents. Please complete prior to contrasted scans.     COMPARISON: CT head 10/14/2024.   ACCESSION NUMBER(S): [id number]   ORDERING CLINICIAN: RAMA AGARWAL   TECHNIQUE: Axial CT scan of head was performed before and after the intravenous administration of 90 mL of Omnipaque 350. Angled reformats in brain and bone windows were generated. The images were reviewed in bone, brain, blood and soft tissue windows.   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 There is fat within the mastoidectomy bed and there is a small focus of fat extending superficially beyond the lateral aspect of the cranioplasty, for example axial image 24 of 92. There is a defect in the anterior aspect of the cranioplasty extending inferiorly. There is a rim enhancing fluid collection just superficial and along the inferior aspect of the right cranioplasty, measuring 1.8 x 1.9 cm axially and 6.0 cm craniocaudally, coronal image 71 of 121. Defect in the  inferior aspect of the cranioplasty visualized on coronal image 69 consistent with wound dehiscence. Focus of fat extending extra cranially on coronal image 64 of 121.   Ventricles, sulci and basal cisterns are within normal limits. No extra-axial fluid collection.   No acute intraparenchymal hemorrhage or parenchymal evidence of acute large territorial ischemic infarct. Gray-white differentiation is intact. No mass effect or midline shift.   Aside from postsurgical changes as detailed above, the calvarium is unremarkable.   Visualized paranasal sinuses and left mastoid are clear.       1. Postsurgical changes of right translabyrinthine craniotomy. Overlying the surgical site is a rim enhancing fluid collection which may represent seroma, hematoma, or pseudomeningocele. Sterility of this collection can not be determined on imaging. There is a small focus of fat extending extra cranially beyond the mastoidectomy bed and the defect in the anterior aspect of the cranioplasty concerning for wound dehiscence, axial image 25 of 92. 2. No hydrocephalus. Ventricles are unchanged in size and appear normal. 3. Interval resolution of previously seen hyperdense blood products within the right cerebellar pontine angle.   I personally reviewed the images/study and I agree with the findings as stated by Casimiro Hylton MD (Radiology Resident).   MACRO: None     Signed by: Kalin Moreno 11/14/2024 4:25 AM Dictation workstation:   [id number]    CT soft tissue neck w IV contrast    Result Date: 11/14/2024  Interpreted By:  Kalin Moreno, STUDY: CT SOFT TISSUE NECK W IV CONTRAST;  11/14/2024 2:38 am   INDICATION: Signs/Symptoms:post ENT surgery, severe headache, c/f wound infection. Per EMR schwannoma resection on 10/14/2024..   COMPARISON: MRI of internal auditory canals dated 08/06/2024.   ACCESSION NUMBER(S): [id number]   ORDERING CLINICIAN: RAMA AGARWAL   TECHNIQUE: Axial CT images of the neck were  obtained.  The patient received 90 cc Omnipaque 350 intravenous contrast agent. The images were reformatted in angled axial, coronal and sagittal planes.   FINDINGS: Partially visualized postsurgical change of right translabyrinthine approach partial mastoidectomy with cerebellar pontine mass resection. There is postsurgical change of right temporal craniotomy and cranioplasty with fat containing surgical material. There is a defect in the anterior aspect of the cranioplasty, for example axial image 22 of 219 and there is a rim enhancing fluid containing extra-axial collection along the inferior aspect of the surgical material with a fluid collection measuring 2.3 x 1.6 cm axially on image 53 and 4.8 cm craniocaudally on coronal image 91. There is a focus of fat extending extra cranially on axial image 27 of 219 concerning for wound dehiscence.   Oral Cavity, Pharynx and Larynx:  There is no evidence of mass in the oral cavity, oropharynx, hypopharynx, supraglottic, and infraglottic larynx. Dystrophic calcifications in the peritonsillar regions which may represent sequela of infection/inflammation. The vocal cords are symmetric.   Retropharyngeal and Prevertebral Soft Tissues: Unremarkable.     Neck vessels: Bilateral neck vessels are normal in course and caliber and appear patent. Sigmoid sinuses are patent.   Thyroid gland: The thyroid gland is unremarkable in size and appearance.   Parotid and submandibular glands: Bilateral parotid and submandibular glands are unremarkable in appearance.   Paranasal Sinuses and Mastoids: Visualized paranasal sinuses are clear. Postsurgical change of partial right-sided mastoidectomy with fat material within the surgical bed. Left mastoid air cells are clear.   Visualized orbital structures are unremarkable.   Lymph nodes: There are few non specific bilateral neck nodes, probably reactive in etiology.     Visualized upper lungs are clear.   Visualized cervical spine appears  unremarkable.       No evidence of significant cervical adenopathy or a soft tissue mass in the neck.   Partially visualized postsurgical change of right cerebellar pontine angle mass resection with translabyrinthine approach and right temporal craniotomy and cranioplasty. There is a defect in the anterior aspect of the cranioplasty and rim enhancing fluid collection along the superficial lateral and inferior aspect of the cranioplasty which may represent postsurgical pseudomeningocele, seroma or hematoma. The sterility of this collection can not be determined on imaging. The findings are concerning for wound dehiscence with a small focus of fat extending extra cranially.   Signed by: Kalin Moreno 11/14/2024 4:12 AM Dictation workstation:   [id number]    ECG 12 lead    Result Date: 11/13/2024  Sinus tachycardia Otherwise normal ECG No previous ECGs available See ED provider note for full interpretation and clinical correlation Confirmed by Sondra Freitas ([id number]) on 11/13/2024 11:19:18 PM         Assessment  Sandy Cao is a 28 y.o. female who is s/p translabyrinthine approach for acoustic neuroma resection 10/14 who presented to [facility] on 11/13/2024 for nausea, vomiting, lightheadedness, headache, light sensitivity, and pain along incision line, no neck stiffness. Exam reassuring, no concern for meningitis. MRI head consistent with pseudomeningocele. Tate dressing applied for pressure to surgical site.    Recommendations  - no concern for meningitis or surgical site infection  - deloris dressing applied for pressure to apply compression. She should wear for at least a few days and continue if swelling persists  - appreciate neurosurgery recommendations regarding low pressure headache    Doreen Dowling MD - PGY2  Otolaryngology - Head and Neck Surgery    ENT Head & Neck phone: [phone]  ENT Consults pager: [phone]   ENT Pediatrics  pager: [phone]  ENT Subspecialty (otology, rhinology, laryngology, plastics,  sleep):   M-F 6am-5pm- EpicChat or page the resident who wrote the daily note   Nights & weekends- [phone]  ENT Outpatient [phone]     I am the day time consult resident. I can only be contacted 6am-5pm. Please contact the teams listed above with any questions or concerns outside of these hours.

## 2024-11-14 NOTE — DISCHARGE INSTRUCTIONS
Discharge home with reassurance, follow-up with neurosurgery, follow-up with ENT.  Take Fioricet as needed for headache.  Take Zofran as needed for nausea.  Return precautions of worsening headache, inability to tolerate oral intake, other concerning symptoms to you.

## 2024-11-14 NOTE — CONSULTS
Consults    Date of Service:  11/14/2024 Attending Provider:  Cora Stanton MD     Reason for Consultation:  Sandy Cao is being seen today for a consult requested by Cora Stanton MD for     Subjective   History of Present Illness:  Sandy is a 28 y.o. 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 Patient states sudden onset of HA 3 days ago that have gotten progressively worse. She states they are much better when laying down 4/10 and worsen when she sits up to 7/10. She also reports incisional pain and that these symptoms are different than her typical migraines. She has not noticed any leakage from her incision or back where she had the lumbar drain. She continues to endorse blurry vision mostly with downward gaze but has improved since surgery.     Review of Systems   10 point ROS is obtained and negative except the ones mentioned in the HPI    Objective     Vitals:  Vitals:    11/13/24 2313   BP: 104/71   Pulse: 85   Resp: 18   Temp:    SpO2: 99%         Exam:  Constitutional: No acute distress, non-toxic appearing  Resp: breathing comfortably on room air  Cardio: well perfused  GI: nondistended  MSK: full range of motion  Neuro: A&Ox3  Cranial Nerves II-XII: OU3R, EOMI, Face symmetric, Facial SILT, Palate/Tongue midline and symmetric, shoulder shrugs symmetric, hearing intact to finger rubs bilaterally  Motor: BUE 5/5 BLE 5/5  Sensation: SILT throughout all extremities  Neg kernig and brudzinski   Psych: appropriate mood  Skin: well healing R cranial incision and prior LP site     Medical History  Past Medical History:   Diagnosis Date    Acoustic neuroma (Multi)     Anxiety     Asthma     Depression     HL (hearing loss)     Mood disorder (CMS-HCC)     Tinnitus        Surgical History  Past Surgical History:   Procedure Laterality Date     ESOPHAGOGASTRODUODENOSCOPY          Medications  Current Outpatient Medications   Medication Instructions    cyclobenzaprine (FLEXERIL) 10 mg, oral, 3 times daily PRN    hydrOXYzine HCL (ATARAX) 25 mg, oral, Every 8 hours PRN    levonorgestrel (Mirena) 20 mcg/24hr IUD 1 each    pantoprazole (PROTONIX) 20 mg, oral, Daily, Do not crush, chew, or split.        Diagnostic Results:    Lab Results   Component Value Date    WBC 10.0 11/13/2024    HGB 13.7 11/13/2024    HCT 39.1 11/13/2024    MCV 83 11/13/2024     11/13/2024     Lab Results   Component Value Date    CREATININE 0.83 11/13/2024    BUN 8 11/13/2024     11/13/2024    K 3.7 11/13/2024     11/13/2024    CO2 26 11/13/2024     Lab Results   Component Value Date    INR 1.1 11/13/2024    INR 1.1 10/15/2024    INR 1.1 10/14/2024    PROTIME 12.1 11/13/2024    PROTIME 12.2 10/15/2024    PROTIME 12.1 10/14/2024       === 10/14/24 ===    CT HEAD WO IV CONTRAST    - Impression -  Postoperative changes as noted. A 1 cm collection of mildly  hyperdense tissue versus hemorrhage lies along the medial aspect of  the surgical bed abutting the veronica.    MACRO:  None    Signed by: Brian Mullen 10/14/2024 6:22 PM  Dictation workstation:   [id number]  === 08/06/24 ===    MR IAC WO AND W CONTRAST    - Impression -  3.2 cm enhancing mass within the right internal auditory canal and  right cerebellopontine angle region likely corresponding to acoustic  neuroma/vestibular schwannoma. There is no definite evidence of  extension to the basal turn of the cochlea. There is mass effect on  the veronica, cerebellum, and right brachium pontis without vasogenic  edema. There is also mild mass effect along the rightward aspect of  the 4th ventricle with no evidence of hydrocephalus.    No acute infarct, recent hemorrhage, or additional abnormal  enhancement or intracranial mass effect.    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    MACRO:  Critical Finding:  See  findings. Notification was initiated on  8/7/2024 at 9:52 am by  Markus Nava.  (**-YCF-**)    Signed by: Markus Nava 8/7/2024 9:53 AM  Dictation workstation:   [id number]      Assessment/Plan   Assessment:  Sandy is a 28 y.o. 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

## 2024-11-14 NOTE — ED PROVIDER NOTES
"Emergency Department Provider Note        History of Present Illness     History provided by: Patient  Limitations to History: None  External Records Reviewed with Brief Summary: Outpatient progress note from Dr. Velasquez which showed PMHx    HPI:  Sandy Cao is a 28 y.o. female with PMHx of acoustic neuroma, sensorineural hearing loss of right ear, s/p right sided translabyrinthine approach for acoustic neuroma resection on 10/14 and repair presenting with complaints of nausea, vomiting, light headedness, severe headache, and light sensitivity. Patient states 2 days ago she began to develop a headache along her incision and the back of the right side of her head. States shortly after she developed nausea and vomiting. Patient states she has tried to take zofran and ibuprofen, but she was unable to keep the medication down. Patient states she has been unable to drink water without becoming nauseous. Patient states her sicatic has been flaring up due to the vomiting. Pateint is also complaining of neck pain, stating her head feels \"too heavy.\" Patient spoke with Dr. Conrad her surgeon who told her to come in to be worked up. Denies F/C/D.     Physical Exam   Triage vitals:  T 36.7 °C (98 °F)  HR (!) 111  /89  RR 17  O2 97 % None (Room air)    Physical Exam  HENT:      Head:        Comments: Cicatrix located over posterior right ear, slight edema, no increased warmth, no dehiscence, no active drainage, slight erythema, no ascending lymphangitits   Eyes:      Extraocular Movements: Extraocular movements intact.      Pupils: Pupils are equal, round, and reactive to light.   Neck:      Comments: Decreased ROM of neck  Cardiovascular:      Rate and Rhythm: Normal rate and regular rhythm.   Pulmonary:      Effort: Pulmonary effort is normal.      Breath sounds: Normal breath sounds.   Abdominal:      Palpations: Abdomen is soft.   Musculoskeletal:         General: Normal range of motion.      Cervical back: " Rigidity present.   Skin:     General: Skin is warm.      Capillary Refill: Capillary refill takes less than 2 seconds.   Neurological:      General: No focal deficit present.      Mental Status: She is alert and oriented to person, place, and time.   Psychiatric:         Mood and Affect: Mood normal.          Medical Decision Making & ED Course   Medical Decision Makin y.o. female with PMHx stated above complaining of headache, nausea, vomiting, neck pain lasting 2 days. Concern for post-op infection.    Ordered zofran, 1L NS, toradol, CMP, coags, ESR, CRP    Labs reviewed, no clear evidence of infection or other abnormality.     Consult placed for ENT. They evaluated the patient, discussed need for CT with IV contrast of head and neck and NSRG consult.  Consult placed for neurosurgery. After reviewing imaging, they discussed recommending MRI of brain and lumbar spine to determine where fluid collection is draining. Not recommending lumbar puncture at this time.     Ordered Reglen and 500mL NS.   Ordered tylenol for headache.     Pending final ENT and NSRG recommendations at time of sign out.     ----  Scoring Tools Utilized: NA    Differential diagnoses considered include but are not limited to:   -post op nausea and vomiting  -headache of undetermined origin  -meningitis     Social Determinants of Health which Significantly Impact Care: None identified     EKG Independent Interpretation: EKG interpreted by myself. Please see ED Course for full interpretation.    Independent Result Review and Interpretation: Relevant laboratory and radiographic results were reviewed and independently interpreted by myself.  As necessary, they are commented on in the ED Course.    Chronic conditions affecting the patient's care: As documented above in MDM    The patient was discussed with the following consultants/services:  ENT  recommending CT head and neck as well as consult to neurosurgery    Care Considerations: As  documented above in [facility]    ED Course:  ED Course as of 11/14/24 0612   Wed Nov 13, 2024   2237 ECG 12 lead  Otherwise normal ECG []   Thu Nov 14, 2024   0149 ECG 12 lead  ECG, per my read, with normal sinus rhythm, heart rate 87 bpm, normal axis and intervals, no T wave inversions, no ST segment abnormalities. No priors before this visit.   [MB]   0345 CT head w and wo IV contrast  1. Postsurgical changes of right translabyrinthine craniotomy, as  above, with residual low-density material within the resection cavity  and improved hyperdense material within the medial aspect of the  surgical bed without significant mass effect on the veronica.  2. No hydrocephalus.  3. No acute intracranial abnormality.   [MC]   0423 CT soft tissue neck w IV contrast  No evidence of significant cervical adenopathy or a soft tissue mass  in the neck.   [MC]   0508 MR brain w and wo IV contrast  . Expected evolution of postsurgical changes from right  translabyrinthine craniotomy with overlying fluid collection which  does not restrict diffusion. Findings may reflect a seroma or  hematoma.  2. Similar appearance of fat extending through the cranioplasty which  may represent dehiscence.   [MC]   0516 MR lumbar spine wo IV contrast  Normal MRI of the lumbar spine. []      ED Course User Index  [MB] Cora Stanton MD  [MC] Jess Ding DPM         Diagnoses as of 11/14/24 0612   Acute nonintractable headache, unspecified headache type   Nausea and vomiting, unspecified vomiting type     Disposition   Patient was signed out to Dr. Dang at 7:00 pending completion of their work-up.  Please see the next provider's transition of care note for the remainder of the patient's care.     Procedures   Procedures    Patient seen and discussed with ED attending physician.    Jess Ding DPM  Emergency Medicine  Resident  11/14/24 9746       Cora Stanton MD  11/15/24 3104

## 2024-11-14 NOTE — SIGNIFICANT EVENT
CT head reviewed with small pseudomeningocele and stable to min decr ventricular caliber, infectious markers low and some improvement in headaches with toradol and reglan. Recommend MRI brain with and without contrast and MRI lumbar spine w/o contrast to assess for potential source of low pressure headaches and CSF leak

## 2024-11-16 LAB
ATRIAL RATE: 87 BPM
P AXIS: 48 DEGREES
P OFFSET: 183 MS
P ONSET: 132 MS
PR INTERVAL: 172 MS
Q ONSET: 218 MS
QRS COUNT: 15 BEATS
QRS DURATION: 84 MS
QT INTERVAL: 350 MS
QTC CALCULATION(BAZETT): 421 MS
QTC FREDERICIA: 396 MS
R AXIS: 58 DEGREES
T AXIS: 23 DEGREES
T OFFSET: 393 MS
VENTRICULAR RATE: 87 BPM

## 2024-11-22 DIAGNOSIS — R11.0 NAUSEA: ICD-10-CM

## 2024-11-22 RX ORDER — ONDANSETRON 4 MG/1
4 TABLET, ORALLY DISINTEGRATING ORAL EVERY 8 HOURS PRN
Qty: 20 TABLET | Refills: 3 | Status: SHIPPED | OUTPATIENT
Start: 2024-11-22 | End: 2024-12-19

## 2024-11-25 ENCOUNTER — APPOINTMENT (OUTPATIENT)
Dept: NEUROSURGERY | Facility: HOSPITAL | Age: 29
End: 2024-11-25
Payer: MEDICAID

## 2024-12-03 ENCOUNTER — OFFICE VISIT (OUTPATIENT)
Dept: NEUROSURGERY | Facility: HOSPITAL | Age: 29
End: 2024-12-03
Payer: MEDICAID

## 2024-12-03 VITALS
HEIGHT: 63 IN | SYSTOLIC BLOOD PRESSURE: 136 MMHG | RESPIRATION RATE: 16 BRPM | WEIGHT: 236 LBS | BODY MASS INDEX: 41.82 KG/M2 | HEART RATE: 98 BPM | DIASTOLIC BLOOD PRESSURE: 94 MMHG

## 2024-12-03 DIAGNOSIS — D33.3 ACOUSTIC NEUROMA (MULTI): ICD-10-CM

## 2024-12-03 DIAGNOSIS — M25.552 PAIN OF LEFT HIP: ICD-10-CM

## 2024-12-03 DIAGNOSIS — Z00.00 HEALTHCARE MAINTENANCE: Primary | ICD-10-CM

## 2024-12-03 PROCEDURE — 99211 OFF/OP EST MAY X REQ PHY/QHP: CPT | Performed by: NURSE PRACTITIONER

## 2024-12-03 PROCEDURE — 3008F BODY MASS INDEX DOCD: CPT | Performed by: NURSE PRACTITIONER

## 2024-12-03 ASSESSMENT — PAIN SCALES - GENERAL: PAINLEVEL_OUTOF10: 7

## 2024-12-03 NOTE — PROGRESS NOTES
"[facility]  Neurosurgery    History of Present Illness      Sandy Cao is a 29-year-old female with a PMH significant for asthma, depression, who presented with right sided tinnitus and hearing loss found to have a R CPA angle tumor. She is now s/p R translab approach for tumor resection on 10/14/24 with Dr. Conrad and Dr. Morgan. Hospital course was significant for diplopia prompting ophthalmological evaluation. Exam consistent with low grade disc edema R > L and was recommended for outpatient follow up and patching of eye. She was discharged to home with PT/OT and vestibular therapy.     Since discharge patient re-presented to the ED on 10/27 with left lower extremity radiculopathy without weakness. She was treated with dexamethasone and medrol dose pack. She was recommended for continued PT. She again was evaluated on 11/14 for low pressure headaches with N/V x 3 days. CTH with small pseudomeningocele but ventricles were stable / decreased in size. MRI brain / spine with postoperative changes and fluid collection consistent with seroma. Lumbar spien was normal. Patient presents to clinic for POV.     Headaches are every other day and are a dull, tight pain. Persistent pseudomeningocele and notices this will goes down when she lays down but gets larger as she is upright. No incisional drainage. They are improving since surgery but has noticed increased frontal sinus pressure but this improved with as needed Mucinex and Dayquil. She was having episodes of vomiting but this was due to her recently having the stomach flu and has resolved and improved with hydration. Only intermittent dizziness when she stands to quickly or prolonged periods of standing. Continued right hip pain which can impair her mobility. Has not been working with PT. Persistent diplopia.             Objective      Vitals:   BP (!) 136/94   Pulse 98   Resp 16   Ht 1.6 m (5' 3\")   Wt 107 kg (236 lb)   BMI 41.81 kg/m² "         Physical Exam:    A&Ox3   Fluent speech   EOMI; FC x 4   JO; strength 5/5; no drift   Face and shoulder shrug symmetrical   SILT   Tongue midline   Incision healed with mild edema ; soft   Gait wide based; antalgic     Relevant Results:    MRI 11/14 with POC and 6.3 x 4.3 x 1.2cm fluid collection         Assessment & Plan      Diagnosis:  Sandy was seen today for post-op.  Diagnoses and all orders for this visit:  Healthcare maintenance  -     Referral to Primary Care; Future  Acoustic neuroma (Multi)  -     CT head wo IV contrast; Future  -     Referral to Ophthalmology; Future  Pain of left hip  -     Referral to Physical Therapy; Future          Provider Impression:     Patient is a 29-year-old female with right sided tinnitus and hearing loss found to have a R CPA angle tumor. She is now s/p R translab approach for tumor resection on 10/14/24 with Dr. Conrad and Dr. Morgan. Persistent headaches postoperatively but improving in severity and frequency. She continues to have hip pain that is affecting her mobility. No additional episodes of vomiting since her recent flu.     - Repeat CTH for evaluation of large fluid collection   - PT referral for hip pain / mobility assessment   - Needs FUV with ophthalmology   - Continue with ENT / audiogram recommendations   - PCP referral placed for health maintenance / medical management.     She will keep me updated regarding her headaches as they are improving. Once her CTH is completed will call with updated plan of care. All questions answered.     Medical History     Past Medical History:   Diagnosis Date    Acoustic neuroma (Multi)     Anxiety     Asthma     Depression     HL (hearing loss)     Mood disorder (CMS-HCC)     Tinnitus      Past Surgical History:   Procedure Laterality Date    ESOPHAGOGASTRODUODENOSCOPY       Social History     Tobacco Use    Smoking status: Never    Smokeless tobacco: Never   Substance Use Topics    Alcohol use: Not Currently     Drug use: Never     No family history on file.  Allergies   Allergen Reactions    Cat Dander Unknown    House Dust Mite Unknown     Current Outpatient Medications   Medication Instructions    cyclobenzaprine (FLEXERIL) 10 mg, oral, 3 times daily PRN    hydrOXYzine HCL (ATARAX) 25 mg, oral, Every 8 hours PRN    levonorgestrel (Mirena) 20 mcg/24hr IUD 1 each    ondansetron ODT (ZOFRAN-ODT) 4 mg, oral, Every 8 hours PRN    pantoprazole (PROTONIX) 20 mg, oral, Daily, Do not crush, chew, or split.

## 2024-12-27 ENCOUNTER — HOSPITAL ENCOUNTER (OUTPATIENT)
Dept: RADIOLOGY | Facility: HOSPITAL | Age: 29
Discharge: HOME | End: 2024-12-27
Payer: MEDICAID

## 2024-12-27 DIAGNOSIS — D33.3 ACOUSTIC NEUROMA (MULTI): ICD-10-CM

## 2024-12-27 PROCEDURE — 70450 CT HEAD/BRAIN W/O DYE: CPT

## 2025-01-24 ENCOUNTER — APPOINTMENT (OUTPATIENT)
Dept: PRIMARY CARE | Facility: CLINIC | Age: 30
End: 2025-01-24
Payer: MEDICAID

## 2025-02-18 ENCOUNTER — APPOINTMENT (OUTPATIENT)
Dept: OPHTHALMOLOGY | Facility: CLINIC | Age: 30
End: 2025-02-18
Payer: MEDICAID

## 2025-02-18 DIAGNOSIS — H47.10 PAPILLEDEMA: Primary | ICD-10-CM

## 2025-02-18 DIAGNOSIS — D33.3 ACOUSTIC NEUROMA (MULTI): ICD-10-CM

## 2025-02-18 DIAGNOSIS — H53.2 DIPLOPIA: ICD-10-CM

## 2025-02-18 PROCEDURE — 99205 OFFICE O/P NEW HI 60 MIN: CPT | Performed by: PSYCHIATRY & NEUROLOGY

## 2025-02-18 PROCEDURE — 92133 CPTRZD OPH DX IMG PST SGM ON: CPT | Performed by: PSYCHIATRY & NEUROLOGY

## 2025-02-18 PROCEDURE — 92083 EXTENDED VISUAL FIELD XM: CPT | Performed by: PSYCHIATRY & NEUROLOGY

## 2025-02-18 PROCEDURE — 92060 SENSORIMOTOR EXAMINATION: CPT | Performed by: PSYCHIATRY & NEUROLOGY

## 2025-02-18 ASSESSMENT — TONOMETRY
IOP_METHOD: GOLDMANN APPLANATION
OS_IOP_MMHG: 13
OD_IOP_MMHG: 13

## 2025-02-18 ASSESSMENT — CONF VISUAL FIELD
OD_INFERIOR_TEMPORAL_RESTRICTION: 0
METHOD: COUNTING FINGERS
OS_NORMAL: 1
OD_SUPERIOR_NASAL_RESTRICTION: 0
OD_INFERIOR_NASAL_RESTRICTION: 0
OS_SUPERIOR_NASAL_RESTRICTION: 0
OS_SUPERIOR_TEMPORAL_RESTRICTION: 0
OD_NORMAL: 1
OS_INFERIOR_TEMPORAL_RESTRICTION: 0
OS_INFERIOR_NASAL_RESTRICTION: 0
OD_SUPERIOR_TEMPORAL_RESTRICTION: 0

## 2025-02-18 ASSESSMENT — EXTERNAL EXAM - RIGHT EYE: OD_EXAM: NORMAL

## 2025-02-18 ASSESSMENT — ENCOUNTER SYMPTOMS
MUSCULOSKELETAL NEGATIVE: 0
CONSTITUTIONAL NEGATIVE: 0
ALLERGIC/IMMUNOLOGIC NEGATIVE: 0
NEUROLOGICAL NEGATIVE: 0
GASTROINTESTINAL NEGATIVE: 0
EYES NEGATIVE: 1
CARDIOVASCULAR NEGATIVE: 0
RESPIRATORY NEGATIVE: 0
PSYCHIATRIC NEGATIVE: 0
ENDOCRINE NEGATIVE: 0
HEMATOLOGIC/LYMPHATIC NEGATIVE: 0

## 2025-02-18 ASSESSMENT — CUP TO DISC RATIO
OS_RATIO: 0.3
OD_RATIO: 0.3

## 2025-02-18 ASSESSMENT — VISUAL ACUITY
CORRECTION_TYPE: GLASSES
OS_CC: 20/20
METHOD: SNELLEN - LINEAR
OS_CC+: -2
OD_CC: 20/20

## 2025-02-18 ASSESSMENT — REFRACTION_WEARINGRX
OD_CYLINDER: -0.25
OD_AXIS: 140
OS_SPHERE: -3.25
OD_SPHERE: -3.50

## 2025-02-18 ASSESSMENT — SLIT LAMP EXAM - LIDS
COMMENTS: NORMAL
COMMENTS: NORMAL

## 2025-02-18 ASSESSMENT — EXTERNAL EXAM - LEFT EYE: OS_EXAM: NORMAL

## 2025-02-18 NOTE — LETTER
February 18, 2025     MARCO Salcedo-CNP  [address]  Department Of Neurological Surgery  [facility] [address]    Patient: Sandy Cao   YOB: 1995   Date of Visit: 2/18/2025     Dear Dr. Lesley Paulson, MARCO-CNP:    I am writing to share my findings regarding our shared patient Sandy Cao from her visit with me on 2/18/2025.    HPI    This 29 year-old woman with a history of vestibular schwannoma status post extended translabryinthe craniotomy resection 10/14/2024, obesity, asthma   presents for evaluation of diplopia and optic disc edema. Double vision has resolved itself since 11/15/2024  -- Attending history below --  This 29 year-old woman with a history of vestibular schwannoma status post extended translabryinthe craniotomy resection 10/14/2024, obesity, asthma presents for evaluation of diplopia and optic disc edema.    She was seen by the ophthalmology consultant 10/15/2024 with concern for diplopia after surgery with right hypertropia and exotropia on examination along with bilateral optic disc edema.    The diplopia resolved by Thanksgiving 2024.    She notes some distortion of left temporal vision.    When she coughs or sneezes, she has some blue floaters. She also sees them with migraine.    The right eye does not cry although she does have some tears.  Last edited by Dharmesh Garcia MD PhD on 2/18/2025  4:38 PM.        Diagnoses    Diagnoses and all orders for this visit:  Papilledema (Primary)  Acoustic neuroma (Multi)  -     Referral to Ophthalmology  Diplopia  -     Hernandez Visual Field - OU - Both Eyes  -     OCT, Optic Nerve - OU - Both Eyes    Assessment and Plan    12/27/2024 CT head without contrast, which I personally reviewed, shows changes of right translabryinthe craniotomy.  11/14/2024 MRI brain with contrast, which I personally reviewed, shows translabryinthe craniotomy with peripheral enhancement at the resection bed.  Interval head  "imaging.  08/06/2024 MRI IAC with contrast, which I personally reviewed, shows a large enhancing right cerebellopontine angle mass with extension in the the internal auditory canal consistent with vestibular schwannoma.    10/14/2024 pathology \"SPECIMEN LABELED RIGHT BRAIN MASS, REMOVAL:  - SCHWANNOMA.\"    Lab Results   Component Value Date/Time    SEDRATE 22 (H) 11/13/2024 1652    SEDRATE 28 (H) 10/27/2024 1341    CRP 0.82 11/13/2024 1652    CRP 0.64 10/27/2024 1341     02/18/2025 OCT RNFL  & .    02/18/2025 HVF 24-2 OD fovea 33, rim defect MD -2.60 & OS fovea 35, wnl MD -2.12.    This 29 year-old woman with a history of vestibular schwannoma status post extended translabryinthe craniotomy resection 10/14/2024, obesity, asthma presents for evaluation of diplopia and optic disc edema.    She has good vision with mild residual esotropia in right gaze consistent with right cranial nerve (CN) VI palsy and mild papilledema. Both are improving relative to inpatient examination. Findings are consistent with the vestibular schwannoma. She could have underlying idiopathic intracranial hypertension, but with a trend of improvement, we discussed options of observation, lumbar puncture or treatment with acetazolamide.    The interpupillary distance, measured at her request, is 60 mm.    Plan    Post-operative surveillance.    Follow up in 3-4 months with stereo plates, HVF & OCT. (Dilated 2/18/2025)      Below you will find my full examination. I appreciate the opportunity to see Sandy Cao today and to share in her care with you. Please contact me if you have questions for me regarding this visit or if I can be of assistance to another of your patients with neuro-ophthalmological problems.    Sincerely,    Dharmesh Garcia MD PhD    CC:   MD Yuri Lindo MD Nicholas C Bambakidis, MD      Base Eye Exam       Visual Acuity (Snellen - Linear)         Right Left    Dist cc 20/20 20/20 -2 "      Correction: Glasses              Tonometry (Goldmann Applanation, 3:43 PM)         Right Left    Pressure 13 13              Pupils         Pupils Dark Light Shape React APD    Right PERRL, No APD 4 2 Round Brisk None    Left PERRL, No APD 4 2 Round Brisk None              Visual Fields (Counting fingers)         Left Right     Full Full              Extraocular Movement         Right Left     Full Full              Neuro/Psych       Oriented x3: Yes              Dilation       Both eyes: 1% Mydriacyl & 2.5% Larry  @ 4:36 PM                  Additional Tests       Color         Right Left    Ishihara 11/11 11/11              Stereo       Fly: +    Animals: 3/3    Circles: 9/9                  Strabismus Exam       Method: [place] long room distance with glasses      Distance Near Near +3.00DS Near Bifocals                      - - - - - -  Ortho  - - - - - -                      Flick E(T)  - -  - -  Ortho  - -  - -  Ortho                      - - - - - -  Ortho  - - - - - -                       Slit Lamp and Fundus Exam       External Exam         Right Left    External Normal Normal              Slit Lamp Exam         Right Left    Lids/Lashes Normal Normal    Conjunctiva/Sclera White and quiet White and quiet    Cornea Clear Clear    Anterior Chamber Deep and quiet Deep and quiet    Iris Round and reactive Round and reactive    Lens Clear Clear    Anterior Vitreous Normal Normal              Fundus Exam         Right Left    Disc Grade 1 edema Grade 1 edema    C/D Ratio 0.3 0.3    Macula Normal Normal    Vessels Normal Normal    Periphery Normal Normal                  Refraction       Wearing Rx         Sphere Cylinder Axis    Right -3.50 -0.25 140    Left -3.25

## 2025-02-18 NOTE — PROGRESS NOTES
"Assessment and Plan    12/27/2024 CT head without contrast, which I personally reviewed, shows changes of right translabryinthe craniotomy.  11/14/2024 MRI brain with contrast, which I personally reviewed, shows translabryinthe craniotomy with peripheral enhancement at the resection bed.  Interval head imaging.  08/06/2024 MRI IAC with contrast, which I personally reviewed, shows a large enhancing right cerebellopontine angle mass with extension in the the internal auditory canal consistent with vestibular schwannoma.    10/14/2024 pathology \"SPECIMEN LABELED RIGHT BRAIN MASS, REMOVAL:  - SCHWANNOMA.\"    Lab Results   Component Value Date/Time    SEDRATE 22 (H) 11/13/2024 1652    SEDRATE 28 (H) 10/27/2024 1341    CRP 0.82 11/13/2024 1652    CRP 0.64 10/27/2024 1341     02/18/2025 OCT RNFL  & .    02/18/2025 HVF 24-2 OD fovea 33, rim defect MD -2.60 & OS fovea 35, wnl MD -2.12.    This 29 year-old woman with a history of vestibular schwannoma status post extended translabryinthe craniotomy resection 10/14/2024, obesity, asthma presents for evaluation of diplopia and optic disc edema.    She has good vision with mild residual esotropia in right gaze consistent with right cranial nerve (CN) VI palsy and mild papilledema. Both are improving relative to inpatient examination. Findings are consistent with the vestibular schwannoma. She could have underlying idiopathic intracranial hypertension, but with a trend of improvement, we discussed options of observation, lumbar puncture or treatment with acetazolamide.    The interpupillary distance, measured at her request, is 60 mm.    Plan    Post-operative surveillance.    Follow up in 3-4 months with stereo plates, HVF & OCT. (Dilated 2/18/2025)  "

## 2025-05-02 ENCOUNTER — APPOINTMENT (OUTPATIENT)
Dept: OTOLARYNGOLOGY | Facility: CLINIC | Age: 30
End: 2025-05-02
Payer: MEDICAID

## 2025-05-09 ENCOUNTER — APPOINTMENT (OUTPATIENT)
Dept: OTOLARYNGOLOGY | Facility: CLINIC | Age: 30
End: 2025-05-09
Payer: MEDICAID

## 2025-05-09 VITALS — HEIGHT: 63 IN | WEIGHT: 230 LBS | BODY MASS INDEX: 40.75 KG/M2

## 2025-05-09 DIAGNOSIS — D33.3 ACOUSTIC NEUROMA (MULTI): ICD-10-CM

## 2025-05-09 DIAGNOSIS — H90.41 SENSORINEURAL HEARING LOSS (SNHL) OF RIGHT EAR WITH UNRESTRICTED HEARING OF LEFT EAR: Primary | ICD-10-CM

## 2025-05-09 PROCEDURE — 3008F BODY MASS INDEX DOCD: CPT | Performed by: OTOLARYNGOLOGY

## 2025-05-09 PROCEDURE — 99213 OFFICE O/P EST LOW 20 MIN: CPT | Performed by: OTOLARYNGOLOGY

## 2025-05-09 PROCEDURE — 1036F TOBACCO NON-USER: CPT | Performed by: OTOLARYNGOLOGY

## 2025-05-09 ASSESSMENT — PATIENT HEALTH QUESTIONNAIRE - PHQ9
SUM OF ALL RESPONSES TO PHQ9 QUESTIONS 1 AND 2: 0
1. LITTLE INTEREST OR PLEASURE IN DOING THINGS: NOT AT ALL
2. FEELING DOWN, DEPRESSED OR HOPELESS: NOT AT ALL

## 2025-05-09 NOTE — PROGRESS NOTES
History of present illness:  Sandy Cao 29 y.o. female  presenting today for follow-up. The patient reports significant improvement in her depressive symptoms. However, she notes experiencing excessive tearing from one eye, “crocodile tears.” Over the past week, she has also been experiencing throbbing headaches. She recalls lying down on the couch when she suddenly heard a popping sensation in her head, after which she began experiencing sharp, throbbing pain. The intensity of the pain has been gradually decreasing since the initial onset.    RECALL 11/1/24   Sandy Cao 28 y.o. female presenting today for first post-op visit. She is status post right sided translabyrinthine approach for acoustic neuroma resection and repair. Patient has been struggling with sciatica, and has been using ice and Flexeril with benefit. She reports increased stuttering and diplopia since surgery, both of which are gradually improving; diplopia now occurs only when looking down. She experiences persistent, loud tinnitus in the right ear that significantly impacts her hearing, which is contributing to feelings of depression and anxiety. Rapid movement in her surroundings causes dizziness, though she denies imbalance and is able to walk steadily. She has not been doing any strenuous activities. She denies any headaches, fevers, chills, or drainage.       The patient's current medications, active allergies and list of medical problems were reviewed in the EHR and confirmed electronically there are as follows;  Allergies:   Allergies[1]  Current list of medications:   Current Medications[2]  Problem list:  Problem List[3]      Physical Examination:  CONSTITUTIONAL:  No acute distress  VOICE:  No hoarseness or other abnormality  RESPIRATION:  Breathing comfortably, no stridor  CV:  No clubbing/cyanosis/edema in hands  EYES:  EOM intact, sclera clear  NEURO:  Alert and oriented times 3, Cranial nerves II-XII grossly intact and  symmetric bilaterally  HEAD AND FACE:  Symmetric facial features, no masses or lesions  RIGHT EAR:  incision is well healed. TM is intact. No effusion. Normal external ear and post auricular area, no visible lesions, external auditory canal patent, tympanic membrane intact, no retraction, no signs of mass, effusion, or infection within the middle ear  LEFT EAR: Normal external ear and post auricular area, no visible lesions, external auditory canal patent, tympanic membrane intact, no retraction, no signs of mass, effusion, or infection within the middle ear  NOSE:  Anterior rhinoscopy clear, no significant external deformity.  ORAL CAVITY/OROPHARYNX/LIPS:  normal lining, mobile tongue.  PHARYNGEAL WALLS: Moist mucosal lining, good palatal elevation  NECK/LYMPH:  No LAD, no thyroid masses, trachea midline  SKIN:  Neck and facial skin is without scar or injury  PSYCH:  Alert and oriented with appropriate mood and affect    Impression:  right  CPA lesion  Right vestibular schwannoma   right  Sensorineural hearing loss  Status post right sided translabyrinthine approach for acoustic neuroma resection and repair.  Facial dyskinesia (crocodile tears)      Recommendation:  Will obtain a repeat MRI in one year from her surgery. We discuss hearing rehabilitation, CROS aid and bone conducting implants. She seemed to be more inclined to have a CROS aid.     I discussed with the patient the complexity of my medical decision making including the treatment and testing rational, indications of their elective procedure and possible adverse effects and/or complications. Based on the provided documentation and my professional assessment of this patient's chronic stable condition, the complexity of evaluation and treatment is low.    This note was created using speech recognition transcription software. Despite proofreading, several typographical errors might be present that might affect the meaning of the content. Please call with  any questions.      Scribe Attestation  By signing my name below, I, Jennyfer Harrison Dorie , Scribe attest that this documentation has been prepared under the direction and in the presence of Estella Conrad MD.         [1]   Allergies  Allergen Reactions    Cat Dander Unknown    House Dust Mite Unknown   [2]   Current Outpatient Medications   Medication Sig Dispense Refill    cyclobenzaprine (Flexeril) 10 mg tablet Take 1 tablet (10 mg) by mouth 3 times a day as needed for muscle spasms for up to 7 days. 21 tablet 0    hydrOXYzine HCL (Atarax) 25 mg tablet Take 1 tablet (25 mg) by mouth every 8 hours if needed for anxiety for up to 7 days. 21 tablet 0    levonorgestrel (Mirena) 20 mcg/24hr IUD 52 mg by intrauterine route.      pantoprazole (ProtoNix) 20 mg EC tablet Take 1 tablet (20 mg) by mouth once daily for 14 days. Do not crush, chew, or split. 14 tablet 0     No current facility-administered medications for this visit.   [3]   Patient Active Problem List  Diagnosis    Sensorineural hearing loss (SNHL) of right ear with unrestricted hearing of left ear    Tinnitus of right ear    Acoustic neuroma (Multi)

## 2025-06-03 ENCOUNTER — APPOINTMENT (OUTPATIENT)
Dept: OPHTHALMOLOGY | Facility: CLINIC | Age: 30
End: 2025-06-03
Payer: MEDICAID

## 2025-09-16 ENCOUNTER — APPOINTMENT (OUTPATIENT)
Dept: OPHTHALMOLOGY | Facility: CLINIC | Age: 30
End: 2025-09-16
Payer: MEDICAID

## (undated) DEVICE — BAG, PLASTIC, 10 X 17 IN

## (undated) DEVICE — Device

## (undated) DEVICE — SPONGE, HEMOSTATIC, GELATIN, SURGIFOAM, 8 X 12.5 CM X 10 MM

## (undated) DEVICE — TUBING, SUCTION, MICROSURGICAL, MICROVAC, 5 FR

## (undated) DEVICE — DRAPE, INSTRUMENT, W/POUCH, STERI DRAPE, 7 X 11 IN, DISPOSABLE, STERILE

## (undated) DEVICE — NEEDLE, ELECTRODE, SUBDERMAL, PAIRED, 2.0 LEAD, DISP

## (undated) DEVICE — DRAPE, MICROSCOPE, FOR ZEISS 65MM, VARI-LENS II, 52 X 150

## (undated) DEVICE — TUBING, SUCTION, CONNECTING, STERILE 0.25 X 120 IN., LF

## (undated) DEVICE — GOWN, SURGICAL, SMARTGOWN, XLARGE, STERILE

## (undated) DEVICE — SUTURE, VICRYL, 3-0,18 IN, SH, UNDYED

## (undated) DEVICE — BUR, 3MM DIAMOND BALL STD

## (undated) DEVICE — DRESSING, EAR, GLASSCOCK, ADULT

## (undated) DEVICE — SPONGE, HEMOSTATIC, CELLULOSE, SURGICEL, FIBRILLAR, 2 X 4 IN

## (undated) DEVICE — ADHESIVE, SKIN, MASTISOL, 2/3 CC VIAL

## (undated) DEVICE — SPONGE, HEMOSTATIC, CELLULOSE, SURGICEL, NU-KNIT, 6 X 9 IN

## (undated) DEVICE — SEALANT, DURASEAL, 5ML

## (undated) DEVICE — TIP, SONOPET IQ, 12CM

## (undated) DEVICE — DRESSING, MEPILEX, BORDER, HEEL, 8.7 X 9.1 IN

## (undated) DEVICE — ELECTRODE, GROUND PLATE

## (undated) DEVICE — SUTURE, MONOCRYLIC, 4-0, P3, MONO 18

## (undated) DEVICE — DRESSING, TRANSPARENT, TEGADERM, 2-3/8 X 2-3/4 IN

## (undated) DEVICE — SPONGE, HEMOSTATIC, CELLULOSE, SURGICEL, 2 X 14 IN

## (undated) DEVICE — CASSETTE, SONOPET IQ IRRIGATION SUCTION

## (undated) DEVICE — NEEDLE, HYPODERMIC, MONOJECT, TRI-BEVELED, ANTI-CORING, 27 G X 1.25 IN, LUER LOCK HUB, YELLOW

## (undated) DEVICE — RETRACTOR, HOOK, FISH, NEURO

## (undated) DEVICE — DRESSING, GAUZE, PETROLATUM, PATCH, XEROFORM, 1 X 8 IN, STERILE

## (undated) DEVICE — SEALANT, HEMOSTATIC, FLOSEAL, 10 ML

## (undated) DEVICE — BUR, ROUTER BIT, FA2, TAPERED, 2.3MM

## (undated) DEVICE — BALL, DIAMOND, 3 MM

## (undated) DEVICE — BALL, FLUTED, 6 MM

## (undated) DEVICE — BLADE, OSCILLATING/SAGITTAL, 25MM X 9MM

## (undated) DEVICE — TAPE, SILK, DURAPORE, 3 IN X 10 YD, LF

## (undated) DEVICE — SUTURE, NUROLON, 4-0, 18 IN, TF, CONTROL RELEASE, MULTIPACK, BLACK

## (undated) DEVICE — BUR, ACORN 9MM PRECISION

## (undated) DEVICE — SPONGE, LAP, XRAY DECT, SC+RFID, 12X12, STERILE

## (undated) DEVICE — WAX, BONE, 2.5 GM

## (undated) DEVICE — STRIP, SKIN CLOSURE, STERI STRIP, REINFORCED, 0.5 X 4 IN

## (undated) DEVICE — BALL, DIAMOND, 2 MM

## (undated) DEVICE — BALL, DIAMOND 5MM

## (undated) DEVICE — DRAPE PACK, CRANIOTOMY, CUSTOM, UHC

## (undated) DEVICE — BALL, FLUTED, 4 MM

## (undated) DEVICE — PROBE, STIMULATOR, MONOPOLAR, FLUSH TIP, PRASS, W/HANDLE, STANDARD

## (undated) DEVICE — BOWL, BASIN, 32 OZ, STERILE

## (undated) DEVICE — BALL, DIAMOND COARSE 3MM STANDARD

## (undated) DEVICE — REST, HEAD, BAGEL, 9 IN

## (undated) DEVICE — CATHETER, URETHRAL, MALECOT, 4 WING, 14 FR, LATEX

## (undated) DEVICE — DRAPE, CRANIOTOMY

## (undated) DEVICE — NEEDLE, ELECTRODE, SUBDERMAL,SINGLE, 200CM LEAD, DISP

## (undated) DEVICE — ELECTRODE, PAIRED SUBDERMAL OTO

## (undated) DEVICE — SPONGE GAUZE, XRAY SC+RFID, 4X4 16 PLY, STERILE

## (undated) DEVICE — MANIFOLD, 4 PORT NEPTUNE STANDARD

## (undated) DEVICE — ADMINISTRATION SET, VENTED, FLASHBALL, 109 IN

## (undated) DEVICE — TUBING, SMOKE EVAC, 3/8 X 10 FT

## (undated) DEVICE — APPLICATOR, PREP, CHLORAPREP, W/ORANGE TINT, 10.5ML

## (undated) DEVICE — COVER, TABLE, UHC

## (undated) DEVICE — COVER, CART, 45 X 27 X 48 IN, CLEAR

## (undated) DEVICE — ELECTRODE, CORKSCREW NEEDLE 1.5M LENGTH

## (undated) DEVICE — CATHETER, IV, ANGIOCATH, 20 G X 1.88 IN, FEP POLYMER

## (undated) DEVICE — DRESSING, MEPILEX, BORDER, SACRUM, 8.7 X 9.8 IN

## (undated) DEVICE — SYRINGE, MONOJECT, LUER LOCK, 3 CC, LF

## (undated) DEVICE — PROTECTOR, NERVE, ULNAR, PINK

## (undated) DEVICE — CUP, SOLUTION

## (undated) DEVICE — MARKER, SKIN, RULER AND LABEL PACK, CUSTOM

## (undated) DEVICE — ELECTRODE, ELECTROSURGICAL, BLADE, INSULATED, ENT/IMA, STERILE

## (undated) DEVICE — CATHETER TRAY, SURESTEP, 16FR, URINE METER W/STATLOCK

## (undated) DEVICE — GOWN, SURGICAL, SMARTGOWN, LARGE, STERILE

## (undated) DEVICE — CONTAINER, SPECIMEN, 120 ML, STERILE

## (undated) DEVICE — PAD, GROUNDING, ELECTROSURGICAL, W/9 FT CABLE, POLYHESIVE II, ADULT, LF